# Patient Record
Sex: FEMALE | Race: WHITE | HISPANIC OR LATINO | Employment: FULL TIME | URBAN - METROPOLITAN AREA
[De-identification: names, ages, dates, MRNs, and addresses within clinical notes are randomized per-mention and may not be internally consistent; named-entity substitution may affect disease eponyms.]

---

## 2017-10-09 ENCOUNTER — NON-PROVIDER VISIT (OUTPATIENT)
Dept: OBGYN | Facility: CLINIC | Age: 27
End: 2017-10-09

## 2017-10-09 DIAGNOSIS — Z32.01 POSITIVE URINE PREGNANCY TEST: ICD-10-CM

## 2017-10-09 LAB
INT CON NEG: NEGATIVE
INT CON POS: POSITIVE
POC URINE PREGNANCY TEST: POSITIVE

## 2017-10-09 PROCEDURE — 81025 URINE PREGNANCY TEST: CPT | Performed by: OBSTETRICS & GYNECOLOGY

## 2017-10-11 ENCOUNTER — APPOINTMENT (OUTPATIENT)
Dept: RADIOLOGY | Facility: MEDICAL CENTER | Age: 27
End: 2017-10-11
Attending: EMERGENCY MEDICINE
Payer: MEDICAID

## 2017-10-11 ENCOUNTER — HOSPITAL ENCOUNTER (EMERGENCY)
Facility: MEDICAL CENTER | Age: 27
End: 2017-10-11
Attending: EMERGENCY MEDICINE
Payer: MEDICAID

## 2017-10-11 VITALS
SYSTOLIC BLOOD PRESSURE: 118 MMHG | HEART RATE: 99 BPM | RESPIRATION RATE: 16 BRPM | OXYGEN SATURATION: 99 % | WEIGHT: 120.81 LBS | DIASTOLIC BLOOD PRESSURE: 62 MMHG | BODY MASS INDEX: 22.23 KG/M2 | TEMPERATURE: 98.2 F | HEIGHT: 62 IN

## 2017-10-11 DIAGNOSIS — O26.891 PELVIC PAIN AFFECTING PREGNANCY IN FIRST TRIMESTER, ANTEPARTUM: ICD-10-CM

## 2017-10-11 DIAGNOSIS — N83.11 CORPUS LUTEUM CYST OF RIGHT OVARY: ICD-10-CM

## 2017-10-11 DIAGNOSIS — R10.2 PELVIC PAIN AFFECTING PREGNANCY IN FIRST TRIMESTER, ANTEPARTUM: ICD-10-CM

## 2017-10-11 LAB
APPEARANCE UR: ABNORMAL
B-HCG SERPL-ACNC: ABNORMAL MIU/ML (ref 0–10)
COLOR UR: YELLOW
GLUCOSE UR STRIP.AUTO-MCNC: NEGATIVE MG/DL
HCG UR QL: POSITIVE
KETONES UR STRIP.AUTO-MCNC: NEGATIVE MG/DL
LEUKOCYTE ESTERASE UR QL STRIP.AUTO: NEGATIVE
NITRITE UR QL STRIP.AUTO: NEGATIVE
NUMBER OF RH DOSES IND 8505RD: NORMAL
PH UR STRIP.AUTO: 6 [PH]
PROT UR QL STRIP: NEGATIVE MG/DL
RBC UR QL AUTO: NEGATIVE
RH BLD: NORMAL
SP GR UR STRIP.AUTO: 1.01

## 2017-10-11 PROCEDURE — 86901 BLOOD TYPING SEROLOGIC RH(D): CPT

## 2017-10-11 PROCEDURE — 36415 COLL VENOUS BLD VENIPUNCTURE: CPT

## 2017-10-11 PROCEDURE — 84702 CHORIONIC GONADOTROPIN TEST: CPT

## 2017-10-11 PROCEDURE — 81002 URINALYSIS NONAUTO W/O SCOPE: CPT

## 2017-10-11 PROCEDURE — 76817 TRANSVAGINAL US OBSTETRIC: CPT

## 2017-10-11 PROCEDURE — 81025 URINE PREGNANCY TEST: CPT

## 2017-10-11 PROCEDURE — 99284 EMERGENCY DEPT VISIT MOD MDM: CPT

## 2017-10-11 ASSESSMENT — PAIN SCALES - GENERAL: PAINLEVEL_OUTOF10: 4

## 2017-10-11 NOTE — ED PROVIDER NOTES
"ED Provider Note    CHIEF COMPLAINT  Chief Complaint   Patient presents with   • Abdominal Cramping   • Pregnancy       HPI  Rachel Green is a 27 y.o. female who presents As a  who believes she is 6 weeks pregnant by dates. She has not had a prenatal visit yet. She states she has had cramping in both sides of her pelvis for the last 2 weeks which is worse in the evenings. She's had nausea with no vomiting. She denies vaginal bleeding or discharge. She denies dysuria or fevers.    REVIEW OF SYSTEMS  See HPI for further details. All other systems are negative.    PAST MEDICAL HISTORY  No past medical history on file.    FAMILY HISTORY  Family History   Problem Relation Age of Onset   • Diabetes Mother    • Diabetes Father        SOCIAL HISTORY  Social History     Social History   • Marital status: Single     Spouse name: N/A   • Number of children: N/A   • Years of education: N/A     Social History Main Topics   • Smoking status: Never Smoker   • Smokeless tobacco: Never Used   • Alcohol use Yes      Comment: rarely   • Drug use: No      Comment: pot x 5 days ago   • Sexual activity: Yes     Partners: Male     Other Topics Concern   • Not on file     Social History Narrative   • No narrative on file       SURGICAL HISTORY  Past Surgical History:   Procedure Laterality Date   • TONSILLECTOMY  08    Performed by TITUS MOORE at SURGERY SAME DAY [facility] ORS   • GYN SURGERY             CURRENT MEDICATIONS  None    ALLERGIES  Allergies   Allergen Reactions   • Nkda [No Known Drug Allergy]        PHYSICAL EXAM  VITAL SIGNS: /56   Pulse 92   Temp 36.8 °C (98.2 °F)   Resp 16   Ht 1.575 m (5' 2\")   Wt 54.8 kg (120 lb 13 oz)   SpO2 98%   BMI 22.10 kg/m²  @HANNA[[id number]::@   Constitutional: Well developed, Well nourished, No acute respiratory distress, Non-toxic appearance.   HENT: Normocephalic, Atraumatic, Bilateral external ears normal, Oropharynx clear, mucous membranes are " moist.  Eyes: PERRLA, EOMI, Conjunctiva normal, No discharge. No icterus.  Neck: Normal range of motion. Supple, No stridor.   Lymphatic: No cervical lymphadenopathy noted.   Cardiovascular: Normal heart rate, Normal rhythm, No murmurs, No rubs, No gallops.   Thorax & Lungs: Clear to auscultation bilaterally, No respiratory distress, No wheezing.  Abdomen: Soft, flat, normal active bowel sounds, no tenderness to palpation, no masses  Pelvic: Deferred as the patient has no unusual discharge or bleeding  Skin: Warm, Dry, No erythema, No rash.   Extremities: Intact distal pulses, No edema, No tenderness  Musculoskeletal: Good range of motion in all major joints. No tenderness to palpation or major deformities noted. Normal gait.  Neurologic: Alert & oriented x 3, cranial nerve and cerebellar exams grossly normal. No focal deficits noted.   Psychiatric: Affect normal, Judgment normal, Mood normal.       RADIOLOGY/PROCEDURES  US-OB PELVIS TRANSVAGINAL   Final Result      Live intrauterine pregnancy of approximately 5 weeks 5 days age by crown-rump length measurement.            COURSE & MEDICAL DECISION MAKING  Pertinent Labs & Imaging studies reviewed. (See chart for details)  Differential diagnosis includes menstrual cramps, ovarian cyst, cystitis, ectopic pregnancy    Results for orders placed or performed during the hospital encounter of 10/11/17   HCG QUANTITATIVE SERUM   Result Value Ref Range    Bhcg 65614.0 (H) 0.0 - 10.0 mIU/mL   RH TYPE FOR RHOGAM FROM E.D.   Result Value Ref Range    Emergency Department Rh Typing POS     Number Of Rh Doses Indicated ZERO    POC UA   Result Value Ref Range    POC Color Yellow     POC Appearance Other (A)     POC Glucose Negative Negative mg/dL    POC Ketones Negative Negative mg/dL    POC Specific Gravity 1.015 1.005 - 1.030    POC Blood Negative Negative    POC Urine PH 6.0 5.0 - 8.0    POC Protein Negative Negative mg/dL    POC Nitrites Negative Negative    POC Leukocyte  Esterase Negative Negative   POC URINE PREGNANCY   Result Value Ref Range    POC Urine Pregnancy Test Positive (A)      6:20 PM no signs of a portion. Patient can continue to take daily prenatal vitamins and Tylenol when necessary.     The patient will return for new or worsening symptoms and is stable at the time of discharge.        DISPOSITION:  Patient will be discharged home in stable condition.    FOLLOW UP:  [facility]  [address] [place] [id number]  [phone]    Call  to make appointment for your 1st trimester      OUTPATIENT MEDICATIONS:  New Prescriptions    No medications on file         FINAL IMPRESSION  1. Pelvic pain affecting pregnancy in first trimester, antepartum    2. Corpus luteum cyst of right ovary               Electronically signed by: Amarilys Batista, 10/11/2017 4:13 PM

## 2017-10-11 NOTE — ED NOTES
Reports recent positive pregnancy test, 6 weeks pregnant. Complaints of abdominal cramping for past few weeks, denies spotting

## 2017-10-12 NOTE — ED NOTES
Assisted with pt care. Pt D/C to home. D/C instructions given. Pt v/u. Pt leaves ED with no acute changes, complaints or concerns.

## 2017-10-12 NOTE — DISCHARGE INSTRUCTIONS
2 Tylenol every 8 hours in case of pain    Take prenatal vitamins every day-this is most important right now

## 2017-10-16 ENCOUNTER — APPOINTMENT (OUTPATIENT)
Dept: OBGYN | Facility: CLINIC | Age: 27
End: 2017-10-16

## 2017-11-20 ENCOUNTER — HOSPITAL ENCOUNTER (OUTPATIENT)
Facility: MEDICAL CENTER | Age: 27
End: 2017-11-20
Attending: NURSE PRACTITIONER
Payer: MEDICAID

## 2017-11-20 ENCOUNTER — INITIAL PRENATAL (OUTPATIENT)
Dept: OBGYN | Facility: CLINIC | Age: 27
End: 2017-11-20
Payer: MEDICAID

## 2017-11-20 VITALS
SYSTOLIC BLOOD PRESSURE: 100 MMHG | DIASTOLIC BLOOD PRESSURE: 64 MMHG | HEIGHT: 63 IN | WEIGHT: 123 LBS | BODY MASS INDEX: 21.79 KG/M2

## 2017-11-20 DIAGNOSIS — Z98.891 HISTORY OF CESAREAN SECTION: ICD-10-CM

## 2017-11-20 DIAGNOSIS — Z34.90 ENCOUNTER FOR SUPERVISION OF NORMAL PREGNANCY, ANTEPARTUM, UNSPECIFIED GRAVIDITY: ICD-10-CM

## 2017-11-20 DIAGNOSIS — O09.92 ENCOUNTER FOR SUPERVISION OF HIGH RISK PREGNANCY IN SECOND TRIMESTER, ANTEPARTUM: Primary | ICD-10-CM

## 2017-11-20 LAB
APPEARANCE UR: NORMAL
BILIRUB UR STRIP-MCNC: NORMAL MG/DL
COLOR UR AUTO: NORMAL
GLUCOSE UR STRIP.AUTO-MCNC: NORMAL MG/DL
KETONES UR STRIP.AUTO-MCNC: NORMAL MG/DL
LEUKOCYTE ESTERASE UR QL STRIP.AUTO: NORMAL
NITRITE UR QL STRIP.AUTO: NORMAL
PH UR STRIP.AUTO: 7.5 [PH] (ref 5–8)
PROT UR QL STRIP: NORMAL MG/DL
RBC UR QL AUTO: NORMAL
SP GR UR STRIP.AUTO: 1
UROBILINOGEN UR STRIP-MCNC: NORMAL MG/DL

## 2017-11-20 PROCEDURE — 87624 HPV HI-RISK TYP POOLED RSLT: CPT

## 2017-11-20 PROCEDURE — 88175 CYTOPATH C/V AUTO FLUID REDO: CPT

## 2017-11-20 PROCEDURE — 87591 N.GONORRHOEAE DNA AMP PROB: CPT

## 2017-11-20 PROCEDURE — 59402 PR NEW OB HIGH RISK: CPT | Performed by: NURSE PRACTITIONER

## 2017-11-20 PROCEDURE — 90686 IIV4 VACC NO PRSV 0.5 ML IM: CPT | Performed by: NURSE PRACTITIONER

## 2017-11-20 PROCEDURE — 81002 URINALYSIS NONAUTO W/O SCOPE: CPT | Performed by: NURSE PRACTITIONER

## 2017-11-20 PROCEDURE — 87491 CHLMYD TRACH DNA AMP PROBE: CPT

## 2017-11-20 PROCEDURE — 90471 IMMUNIZATION ADMIN: CPT | Performed by: NURSE PRACTITIONER

## 2017-11-20 ASSESSMENT — ENCOUNTER SYMPTOMS
EYES NEGATIVE: 1
RESPIRATORY NEGATIVE: 1
CONSTITUTIONAL NEGATIVE: 1
MUSCULOSKELETAL NEGATIVE: 1
CARDIOVASCULAR NEGATIVE: 1
NEUROLOGICAL NEGATIVE: 1
GASTROINTESTINAL NEGATIVE: 1
PSYCHIATRIC NEGATIVE: 1

## 2017-11-20 NOTE — LETTER
Cystic Fibrosis Carrier Testing  Rachel Green    The following information is about a blood test that can be done to determine if you and/or your partner carry the gene for cystic fibrosis.    WHAT IS CYSTIC FIBROSIS?  · Cystic fibrosis (CF) is an inherited disease that affects more than 25,000 American children and young adults.  · Symptoms of CF vary but include lung congestion, pneumonia, diarrhea and poor growth.  Most people with CF have severe medical problems and some die at a young age.  Others have so few symptoms they are unaware they have CF.  · CF does not affect intelligence.  · Although there is no cure for CF at this time, scientists are making progress in improving treatment and in searching for a cure.  In the past many people with CF  at a very young age.  Today, many are living into their 20’s and 30’s.    IS THERE A CHANCE MY BABY COULD HAVE CYSTIC FIBROSIS?  · You can have a child with CF even if there is no history in your family (see chart below).  · CF testing can help determine if you are a carrier and at risk to have a child with CF.  Note: if both parents are carriers, there is a 1 in 4 (25%) chance with each pregnancy that they will have a child with CF.  · Carriers have one normal CF gene and one altered CF gene.  · People with CF have two altered CF genes.  · Most people have two normal copies of the CF gene.    Approximate risk that a couple with no family history of cystic fibrosis will have a child with cystic fibrosis:    Ethnic background / Risk     couple:  1 in 2,500   couple:  1 in 15,000            couple:  1 in 8,000     American couple:  1 in 32,000     WHAT TESTING IS AVAILABLE?  · There is a blood test that can be done to find out if you or your partner is a carrier.  · It is important to understand that CF carrier testing does not detect all CF carriers.  · If the test shows that you are both CF carriers, you unborn baby  can be tested to find out if the baby has CF.    HOW MUCH DOES IT COST TO HAVE CYSTIC FIBROSIS CARRIER TESTING?  · Cost and insurance coverage for CF carrier testing vary depending upon the laboratory used and your insurance policy.  · The average cost for CF carrier testing is $300 per person.  · Your genetic counselor can provide you with more information about cystic fibrosis carrier testing.    _____  Yes, I am interested in discussing carrier testing with a genetic counselor.    _____  No, I am not interested in CF carrier testing or in receiving more information about CF carrier testing.      Client signature: ________________________________________  11/20/2017

## 2017-11-20 NOTE — PROGRESS NOTES
Pt here today for NOB visit. Pt had US done at [facility] on 10/11/2017  LMP: 08/28/2017  WT: 123 lb  BP: 100/64  Pt states having lots of N&V. States no other concerns.   Desires repeat C/Section.   Desires AFP.   Desires flu vaccine  Preferred pharmacy verified with pt.   Stan #[phone]

## 2017-11-20 NOTE — PROGRESS NOTES
"Subjective:      S:  Rachel Green is a 27 y.o.  female  @ EGA: 12w0d KADIE: Estimated Date of Delivery: 18  per US who presents for her new OB exam. She has a history of C/S at term for fetal distress. Her daughter spent 4 weeks in NICU and has CP and epilepsy. Desires a repeat C/S. She has no complaints.  Desires AFP.  Declines CF.  Reports no FM, VB, LOF, or cramping.  Denies dysuria, vaginal DC.  Pt is single and lives with daughter. Works as as  and . No heavy lifting and works in ventilated area..  Pregnancy is desired.  Declines Centering Pregnancy.    O:    Vitals:    17 1324   BP: 100/64   Weight: 55.8 kg (123 lb)   Height: 1.595 m (5' 2.8\")    See H&P Prenatal Physical.  Wet mount: not indicated        FHTs: 160        Fundal ht: 12cm     A:   1.  IUP @ 12w0d KADIE: Estimated Date of Delivery: 18 per US         2.  S=D        3.    Patient Active Problem List    Diagnosis Date Noted   • Encounter for supervision of high risk pregnancy in second trimester, antepartum 2017   • History of  section, desires repeat 2017         P:  1.  GC/CT done. Pap done.         2.  Prenatal labs ordered - lab slip given        3.  Discussed PNV, diet, and adequate water intake        4.  NOB packet given        5.  Return to office in 4 wks        6.  Complete OB US in 7-8 wks          HPI    Review of Systems   Constitutional: Negative.    HENT: Negative.    Eyes: Negative.    Respiratory: Negative.    Cardiovascular: Negative.    Gastrointestinal: Negative.    Genitourinary: Negative.         Uterus enlarged   Musculoskeletal: Negative.    Skin: Negative.    Neurological: Negative.    Endo/Heme/Allergies: Negative.    Psychiatric/Behavioral: Negative.    All other systems reviewed and are negative.         Objective:     /64   Ht 1.595 m (5' 2.8\")   Wt 55.8 kg (123 lb)   LMP 2017   BMI 21.93 kg/m²      Physical Exam "   Constitutional: She is oriented to person, place, and time. She appears well-developed and well-nourished.   HENT:   Head: Normocephalic and atraumatic.   Eyes: Pupils are equal, round, and reactive to light.   Neck: Normal range of motion. Neck supple.   Cardiovascular: Normal rate, regular rhythm and normal heart sounds.    Pulmonary/Chest: Effort normal and breath sounds normal.   Abdominal: Soft.   Genitourinary: Vagina normal and uterus normal.   Musculoskeletal: Normal range of motion.   Neurological: She is alert and oriented to person, place, and time. She has normal reflexes.   Skin: Skin is warm and dry.   Psychiatric: She has a normal mood and affect. Her behavior is normal. Judgment and thought content normal.   Nursing note and vitals reviewed.              Assessment/Plan:     1. Encounter for supervision of high risk pregnancy in second trimester, antepartum      2. Encounter for supervision of normal pregnancy, antepartum, unspecified     - POCT Urinalysis  - PREG CNTR PRENATAL PN; Future  - THINPREP PAP W/HPV AND CTNG; Future  - US-OB 2ND 3RD TRI COMPLETE; Future  - CONSENT FOR INFLUENZA VACCINE  - Flu Quad Inj >3 Year Pre-Filled (Preservative Free)    3. History of  section, desires repeat

## 2017-11-21 DIAGNOSIS — Z34.90 ENCOUNTER FOR SUPERVISION OF NORMAL PREGNANCY, ANTEPARTUM, UNSPECIFIED GRAVIDITY: ICD-10-CM

## 2017-11-22 LAB
C TRACH DNA GENITAL QL NAA+PROBE: NEGATIVE
CYTOLOGY REG CYTOL: NORMAL
HPV HR 12 DNA CVX QL NAA+PROBE: NEGATIVE
HPV16 DNA SPEC QL NAA+PROBE: NEGATIVE
HPV18 DNA SPEC QL NAA+PROBE: NEGATIVE
N GONORRHOEA DNA GENITAL QL NAA+PROBE: NEGATIVE
SPECIMEN SOURCE: NORMAL
SPECIMEN SOURCE: NORMAL

## 2017-12-04 ENCOUNTER — HOSPITAL ENCOUNTER (OUTPATIENT)
Dept: LAB | Facility: MEDICAL CENTER | Age: 27
End: 2017-12-04
Attending: NURSE PRACTITIONER
Payer: MEDICAID

## 2017-12-04 DIAGNOSIS — Z34.90 ENCOUNTER FOR SUPERVISION OF NORMAL PREGNANCY, ANTEPARTUM, UNSPECIFIED GRAVIDITY: ICD-10-CM

## 2017-12-04 PROBLEM — Z28.39 RUBELLA NON-IMMUNE STATUS, ANTEPARTUM: Status: ACTIVE | Noted: 2017-12-04

## 2017-12-04 PROBLEM — O09.899 RUBELLA NON-IMMUNE STATUS, ANTEPARTUM: Status: ACTIVE | Noted: 2017-12-04

## 2017-12-04 LAB
ABO GROUP BLD: NORMAL
APPEARANCE UR: ABNORMAL
BACTERIA #/AREA URNS HPF: ABNORMAL /HPF
BASOPHILS # BLD AUTO: 0.4 % (ref 0–1.8)
BASOPHILS # BLD: 0.03 K/UL (ref 0–0.12)
BILIRUB UR QL STRIP.AUTO: NEGATIVE
BLD GP AB SCN SERPL QL: NORMAL
COLOR UR: YELLOW
CULTURE IF INDICATED INDCX: YES UA CULTURE
EOSINOPHIL # BLD AUTO: 0.07 K/UL (ref 0–0.51)
EOSINOPHIL NFR BLD: 0.9 % (ref 0–6.9)
EPI CELLS #/AREA URNS HPF: ABNORMAL /HPF
ERYTHROCYTE [DISTWIDTH] IN BLOOD BY AUTOMATED COUNT: 40.4 FL (ref 35.9–50)
GLUCOSE UR STRIP.AUTO-MCNC: NEGATIVE MG/DL
HBV SURFACE AG SER QL: NEGATIVE
HCT VFR BLD AUTO: 37.8 % (ref 37–47)
HGB BLD-MCNC: 12.9 G/DL (ref 12–16)
HIV 1+2 AB+HIV1 P24 AG SERPL QL IA: NON REACTIVE
IMM GRANULOCYTES # BLD AUTO: 0.02 K/UL (ref 0–0.11)
IMM GRANULOCYTES NFR BLD AUTO: 0.2 % (ref 0–0.9)
KETONES UR STRIP.AUTO-MCNC: NEGATIVE MG/DL
LEUKOCYTE ESTERASE UR QL STRIP.AUTO: ABNORMAL
LYMPHOCYTES # BLD AUTO: 1.99 K/UL (ref 1–4.8)
LYMPHOCYTES NFR BLD: 24.6 % (ref 22–41)
MCH RBC QN AUTO: 31.5 PG (ref 27–33)
MCHC RBC AUTO-ENTMCNC: 34.1 G/DL (ref 33.6–35)
MCV RBC AUTO: 92.2 FL (ref 81.4–97.8)
MICRO URNS: ABNORMAL
MONOCYTES # BLD AUTO: 0.57 K/UL (ref 0–0.85)
MONOCYTES NFR BLD AUTO: 7.1 % (ref 0–13.4)
NEUTROPHILS # BLD AUTO: 5.4 K/UL (ref 2–7.15)
NEUTROPHILS NFR BLD: 66.8 % (ref 44–72)
NITRITE UR QL STRIP.AUTO: NEGATIVE
NRBC # BLD AUTO: 0 K/UL
NRBC BLD AUTO-RTO: 0 /100 WBC
PH UR STRIP.AUTO: 6 [PH]
PLATELET # BLD AUTO: 215 K/UL (ref 164–446)
PMV BLD AUTO: 10.7 FL (ref 9–12.9)
PROT UR QL STRIP: NEGATIVE MG/DL
RBC # BLD AUTO: 4.1 M/UL (ref 4.2–5.4)
RBC # URNS HPF: ABNORMAL /HPF
RBC UR QL AUTO: NEGATIVE
RH BLD: NORMAL
RUBV AB SER QL: 8.2 IU/ML
SP GR UR STRIP.AUTO: 1.02
TREPONEMA PALLIDUM IGG+IGM AB [PRESENCE] IN SERUM OR PLASMA BY IMMUNOASSAY: NON REACTIVE
UROBILINOGEN UR STRIP.AUTO-MCNC: 0.2 MG/DL
WBC # BLD AUTO: 8.1 K/UL (ref 4.8–10.8)
WBC #/AREA URNS HPF: ABNORMAL /HPF

## 2017-12-04 PROCEDURE — 87086 URINE CULTURE/COLONY COUNT: CPT

## 2017-12-04 PROCEDURE — 86762 RUBELLA ANTIBODY: CPT

## 2017-12-04 PROCEDURE — 86850 RBC ANTIBODY SCREEN: CPT

## 2017-12-04 PROCEDURE — 86780 TREPONEMA PALLIDUM: CPT

## 2017-12-04 PROCEDURE — 87340 HEPATITIS B SURFACE AG IA: CPT

## 2017-12-04 PROCEDURE — 87389 HIV-1 AG W/HIV-1&-2 AB AG IA: CPT

## 2017-12-04 PROCEDURE — 86900 BLOOD TYPING SEROLOGIC ABO: CPT

## 2017-12-04 PROCEDURE — 85025 COMPLETE CBC W/AUTO DIFF WBC: CPT

## 2017-12-04 PROCEDURE — 36415 COLL VENOUS BLD VENIPUNCTURE: CPT

## 2017-12-04 PROCEDURE — 86901 BLOOD TYPING SEROLOGIC RH(D): CPT

## 2017-12-04 PROCEDURE — 87077 CULTURE AEROBIC IDENTIFY: CPT

## 2017-12-04 PROCEDURE — 81001 URINALYSIS AUTO W/SCOPE: CPT

## 2017-12-06 ENCOUNTER — TELEPHONE (OUTPATIENT)
Dept: OBGYN | Facility: CLINIC | Age: 27
End: 2017-12-06

## 2017-12-06 PROBLEM — R82.71 GBS BACTERIURIA: Status: ACTIVE | Noted: 2017-12-06

## 2017-12-06 LAB
BACTERIA UR CULT: ABNORMAL
SIGNIFICANT IND 70042: ABNORMAL
SOURCE SOURCE: ABNORMAL

## 2017-12-06 RX ORDER — CEPHALEXIN 500 MG/1
500 CAPSULE ORAL 2 TIMES DAILY
Qty: 14 CAP | Refills: 0 | Status: SHIPPED | OUTPATIENT
Start: 2017-12-06 | End: 2017-12-13

## 2017-12-06 NOTE — TELEPHONE ENCOUNTER
----- Message from JUHI Manzano sent at 12/6/2017  9:11 AM PST -----  Call patient and let her know her urine culture has GBS in urine ask if she is symptomatic for UTI   If not will treat in labor if any s/s will treat now and in labor, let us know  Thanks Shikha  12/6/17. Patient report burning with urination and urinary frequency. Pharmacy verified. Consulted with Rosalee martinez CNM. She agrees to send RX to pharmacy.  msg left on patient stating Rx was sent and to call back in case of questions.

## 2018-01-03 ENCOUNTER — APPOINTMENT (OUTPATIENT)
Dept: RADIOLOGY | Facility: MEDICAL CENTER | Age: 28
End: 2018-01-03
Attending: EMERGENCY MEDICINE
Payer: MEDICAID

## 2018-01-03 ENCOUNTER — HOSPITAL ENCOUNTER (EMERGENCY)
Facility: MEDICAL CENTER | Age: 28
End: 2018-01-03
Attending: EMERGENCY MEDICINE
Payer: MEDICAID

## 2018-01-03 VITALS
HEART RATE: 80 BPM | SYSTOLIC BLOOD PRESSURE: 105 MMHG | WEIGHT: 130.73 LBS | OXYGEN SATURATION: 99 % | RESPIRATION RATE: 16 BRPM | BODY MASS INDEX: 24.06 KG/M2 | HEIGHT: 62 IN | TEMPERATURE: 97.9 F | DIASTOLIC BLOOD PRESSURE: 68 MMHG

## 2018-01-03 DIAGNOSIS — O26.899 PELVIC PAIN IN PREGNANCY: ICD-10-CM

## 2018-01-03 DIAGNOSIS — O21.9 NAUSEA AND VOMITING DURING PREGNANCY: ICD-10-CM

## 2018-01-03 DIAGNOSIS — R10.2 PELVIC PAIN IN PREGNANCY: ICD-10-CM

## 2018-01-03 LAB
ALBUMIN SERPL BCP-MCNC: 3.8 G/DL (ref 3.2–4.9)
ALBUMIN/GLOB SERPL: 1.2 G/DL
ALP SERPL-CCNC: 32 U/L (ref 30–99)
ALT SERPL-CCNC: 15 U/L (ref 2–50)
ANION GAP SERPL CALC-SCNC: 8 MMOL/L (ref 0–11.9)
APPEARANCE UR: CLEAR
AST SERPL-CCNC: 16 U/L (ref 12–45)
B-HCG SERPL-ACNC: ABNORMAL MIU/ML (ref 0–5)
BASOPHILS # BLD AUTO: 0.2 % (ref 0–1.8)
BASOPHILS # BLD: 0.02 K/UL (ref 0–0.12)
BILIRUB SERPL-MCNC: 0.3 MG/DL (ref 0.1–1.5)
BILIRUB UR QL STRIP.AUTO: NEGATIVE
BUN SERPL-MCNC: 9 MG/DL (ref 8–22)
CALCIUM SERPL-MCNC: 8.6 MG/DL (ref 8.5–10.5)
CHLORIDE SERPL-SCNC: 106 MMOL/L (ref 96–112)
CO2 SERPL-SCNC: 21 MMOL/L (ref 20–33)
COLOR UR: YELLOW
CREAT SERPL-MCNC: 0.32 MG/DL (ref 0.5–1.4)
EOSINOPHIL # BLD AUTO: 0.07 K/UL (ref 0–0.51)
EOSINOPHIL NFR BLD: 0.7 % (ref 0–6.9)
ERYTHROCYTE [DISTWIDTH] IN BLOOD BY AUTOMATED COUNT: 39.4 FL (ref 35.9–50)
GFR SERPL CREATININE-BSD FRML MDRD: >60 ML/MIN/1.73 M 2
GLOBULIN SER CALC-MCNC: 3.1 G/DL (ref 1.9–3.5)
GLUCOSE SERPL-MCNC: 88 MG/DL (ref 65–99)
GLUCOSE UR STRIP.AUTO-MCNC: NEGATIVE MG/DL
HCT VFR BLD AUTO: 35.2 % (ref 37–47)
HGB BLD-MCNC: 12.3 G/DL (ref 12–16)
IMM GRANULOCYTES # BLD AUTO: 0.05 K/UL (ref 0–0.11)
IMM GRANULOCYTES NFR BLD AUTO: 0.5 % (ref 0–0.9)
KETONES UR STRIP.AUTO-MCNC: NEGATIVE MG/DL
LEUKOCYTE ESTERASE UR QL STRIP.AUTO: NEGATIVE
LIPASE SERPL-CCNC: 24 U/L (ref 11–82)
LYMPHOCYTES # BLD AUTO: 1.96 K/UL (ref 1–4.8)
LYMPHOCYTES NFR BLD: 20.4 % (ref 22–41)
MCH RBC QN AUTO: 31.7 PG (ref 27–33)
MCHC RBC AUTO-ENTMCNC: 34.9 G/DL (ref 33.6–35)
MCV RBC AUTO: 90.7 FL (ref 81.4–97.8)
MICRO URNS: NORMAL
MONOCYTES # BLD AUTO: 0.69 K/UL (ref 0–0.85)
MONOCYTES NFR BLD AUTO: 7.2 % (ref 0–13.4)
NEUTROPHILS # BLD AUTO: 6.83 K/UL (ref 2–7.15)
NEUTROPHILS NFR BLD: 71 % (ref 44–72)
NITRITE UR QL STRIP.AUTO: NEGATIVE
NRBC # BLD AUTO: 0 K/UL
NRBC BLD-RTO: 0 /100 WBC
PH UR STRIP.AUTO: 7.5 [PH]
PLATELET # BLD AUTO: 200 K/UL (ref 164–446)
PMV BLD AUTO: 9.2 FL (ref 9–12.9)
POTASSIUM SERPL-SCNC: 3.7 MMOL/L (ref 3.6–5.5)
PROT SERPL-MCNC: 6.9 G/DL (ref 6–8.2)
PROT UR QL STRIP: NEGATIVE MG/DL
RBC # BLD AUTO: 3.88 M/UL (ref 4.2–5.4)
RBC UR QL AUTO: NEGATIVE
SODIUM SERPL-SCNC: 135 MMOL/L (ref 135–145)
SP GR UR STRIP.AUTO: 1.01
UROBILINOGEN UR STRIP.AUTO-MCNC: 0.2 MG/DL
WBC # BLD AUTO: 9.6 K/UL (ref 4.8–10.8)

## 2018-01-03 PROCEDURE — 700111 HCHG RX REV CODE 636 W/ 250 OVERRIDE (IP): Performed by: EMERGENCY MEDICINE

## 2018-01-03 PROCEDURE — 83690 ASSAY OF LIPASE: CPT

## 2018-01-03 PROCEDURE — 84702 CHORIONIC GONADOTROPIN TEST: CPT

## 2018-01-03 PROCEDURE — 36415 COLL VENOUS BLD VENIPUNCTURE: CPT

## 2018-01-03 PROCEDURE — 85025 COMPLETE CBC W/AUTO DIFF WBC: CPT

## 2018-01-03 PROCEDURE — 76815 OB US LIMITED FETUS(S): CPT

## 2018-01-03 PROCEDURE — 99284 EMERGENCY DEPT VISIT MOD MDM: CPT

## 2018-01-03 PROCEDURE — 80053 COMPREHEN METABOLIC PANEL: CPT

## 2018-01-03 PROCEDURE — 81003 URINALYSIS AUTO W/O SCOPE: CPT

## 2018-01-03 RX ORDER — ONDANSETRON 4 MG/1
4 TABLET, ORALLY DISINTEGRATING ORAL EVERY 8 HOURS PRN
Qty: 10 TAB | Refills: 0 | Status: SHIPPED | OUTPATIENT
Start: 2018-01-03 | End: 2021-07-19

## 2018-01-03 RX ORDER — ONDANSETRON 4 MG/1
4 TABLET, ORALLY DISINTEGRATING ORAL ONCE
Status: COMPLETED | OUTPATIENT
Start: 2018-01-03 | End: 2018-01-03

## 2018-01-03 RX ADMIN — ONDANSETRON 4 MG: 4 TABLET, ORALLY DISINTEGRATING ORAL at 16:49

## 2018-01-03 ASSESSMENT — PAIN SCALES - GENERAL: PAINLEVEL_OUTOF10: 7

## 2018-01-03 NOTE — ED NOTES
Pt ambulates to triage  Chief Complaint   Patient presents with   • N/V     x 5 days   • Abdominal Cramping   17 wks pregnant, denies vaginal bleeding  Pt asked to wait in lobby, pt updated on triage process and pt asked to inform RN of any changes.

## 2018-01-04 NOTE — ED NOTES
"Pt states that she is having intermittent abd cramping, denies urinary sx, denies vaginal bleeding. Pt states that \"when I had my daughter the placenta  from my uterus\", NAD at this time.  "

## 2018-01-04 NOTE — ED PROVIDER NOTES
"ED Provider Note    CHIEF COMPLAINT  Chief Complaint   Patient presents with   • N/V     x 5 days   • Abdominal Cramping       HPI  Rachel Green is a 27 y.o. female who presents For evaluation of abdominal pain, nausea and vomiting in the setting of pregnancy. She is  at approximate 17 weeks, over this time frame she's had no diarrhea, no dysuria or hematuria, no vaginal bleeding or discharge. She states her pain is more similar with abdominal and pelvic cramping. No significant back pain or chest pain or shortness of breath, no fever. She states that she is otherwise healthy and has no significant medical problems.    REVIEW OF SYSTEMS  Negative for fever, rash, chest pain, dyspnea,headache, focal weakness, focal numbness, focal tingling, back pain. All other systems are negative.     PAST MEDICAL HISTORY  Past Medical History:   Diagnosis Date   • Blood transfusion without reported diagnosis        FAMILY HISTORY  Family History   Problem Relation Age of Onset   • Diabetes Mother    • Diabetes Father        SOCIAL HISTORY  Social History   Substance Use Topics   • Smoking status: Current Every Day Smoker     Years: 3.00     Types: Cigarettes   • Smokeless tobacco: Never Used      Comment: Quit on 2017   • Alcohol use Yes       SURGICAL HISTORY  Past Surgical History:   Procedure Laterality Date   • TONSILLECTOMY  08    Performed by TITUS MOORE at SURGERY SAME DAY [facility] ORS   • PRIMARY C SECTION  2007   • GYN SURGERY         • OTHER      tubes in the ears at age 22       CURRENT MEDICATIONS  I personally reviewed the medication list in the charting documentation.     ALLERGIES  Allergies   Allergen Reactions   • Nkda [No Known Drug Allergy]        MEDICAL RECORD  I have reviewed patient's medical record and pertinent results are listed above.      PHYSICAL EXAM  VITAL SIGNS: /83   Pulse 92   Temp 36.6 °C (97.9 °F) (Temporal)   Resp 18   Ht 1.575 m (5' 2\")   " Wt 59.3 kg (130 lb 11.7 oz)   LMP 08/28/2017   SpO2 97%   BMI 23.91 kg/m²    Constitutional: Well appearing patient in no acute distress.  Not toxic, nor ill in appearance.  HENT: Mucus membranes moist.    Eyes: No scleral icterus. Normal conjunctiva   Neck: Supple, comfortable, nonpainful range of motion.   Cardiovascular: Regular heart rate and rhythm.   Thorax & Lungs: Chest is nontender.  Lungs are clear to auscultation with good air movement bilaterally.  No wheeze, rhonchi, nor rales.   Abdomen: Soft, nondistended, gravid, mild generalized lower abdominal tenderness, no rebound or guarding  Skin: Warm, dry. No rash appreciated  Extremities/Musculoskeletal: No sign of trauma. No asymmetric calf tenderness, erythema or edema. Normal range of motion   Neurologic: Alert & oriented. No focal deficits observed.   Psychiatric: Normal affect appropriate for the clinical situation.    DIAGNOSTIC STUDIES / PROCEDURES    LABS  Results for orders placed or performed during the hospital encounter of 01/03/18   URINALYSIS   Result Value Ref Range    Color Yellow     Character Clear     Specific Gravity 1.006 <1.035    Ph 7.5 5.0 - 8.0    Glucose Negative Negative mg/dL    Ketones Negative Negative mg/dL    Protein Negative Negative mg/dL    Bilirubin Negative Negative    Urobilinogen, Urine 0.2 Negative    Nitrite Negative Negative    Leukocyte Esterase Negative Negative    Occult Blood Negative Negative    Micro Urine Req see below    CBC WITH DIFFERENTIAL   Result Value Ref Range    WBC 9.6 4.8 - 10.8 K/uL    RBC 3.88 (L) 4.20 - 5.40 M/uL    Hemoglobin 12.3 12.0 - 16.0 g/dL    Hematocrit 35.2 (L) 37.0 - 47.0 %    MCV 90.7 81.4 - 97.8 fL    MCH 31.7 27.0 - 33.0 pg    MCHC 34.9 33.6 - 35.0 g/dL    RDW 39.4 35.9 - 50.0 fL    Platelet Count 200 164 - 446 K/uL    MPV 9.2 9.0 - 12.9 fL    Neutrophils-Polys 71.00 44.00 - 72.00 %    Lymphocytes 20.40 (L) 22.00 - 41.00 %    Monocytes 7.20 0.00 - 13.40 %    Eosinophils 0.70 0.00  - 6.90 %    Basophils 0.20 0.00 - 1.80 %    Immature Granulocytes 0.50 0.00 - 0.90 %    Nucleated RBC 0.00 /100 WBC    Neutrophils (Absolute) 6.83 2.00 - 7.15 K/uL    Lymphs (Absolute) 1.96 1.00 - 4.80 K/uL    Monos (Absolute) 0.69 0.00 - 0.85 K/uL    Eos (Absolute) 0.07 0.00 - 0.51 K/uL    Baso (Absolute) 0.02 0.00 - 0.12 K/uL    Immature Granulocytes (abs) 0.05 0.00 - 0.11 K/uL    NRBC (Absolute) 0.00 K/uL   COMP METABOLIC PANEL   Result Value Ref Range    Sodium 135 135 - 145 mmol/L    Potassium 3.7 3.6 - 5.5 mmol/L    Chloride 106 96 - 112 mmol/L    Co2 21 20 - 33 mmol/L    Anion Gap 8.0 0.0 - 11.9    Glucose 88 65 - 99 mg/dL    Bun 9 8 - 22 mg/dL    Creatinine 0.32 (L) 0.50 - 1.40 mg/dL    Calcium 8.6 8.5 - 10.5 mg/dL    AST(SGOT) 16 12 - 45 U/L    ALT(SGPT) 15 2 - 50 U/L    Alkaline Phosphatase 32 30 - 99 U/L    Total Bilirubin 0.3 0.1 - 1.5 mg/dL    Albumin 3.8 3.2 - 4.9 g/dL    Total Protein 6.9 6.0 - 8.2 g/dL    Globulin 3.1 1.9 - 3.5 g/dL    A-G Ratio 1.2 g/dL   LIPASE   Result Value Ref Range    Lipase 24 11 - 82 U/L   BETA-HCG QUANTITATIVE SERUM   Result Value Ref Range    [facility] ([facility] 0.0 - 5.0 mIU/mL   ESTIMATED GFR   Result Value Ref Range    GFR If African American >60 >60 mL/min/1.73 m 2    GFR If Non African American >60 >60 mL/min/1.73 m 2         RADIOLOGY  US-OB LIMITED TRANSABDOMINAL   Final Result      Single intrauterine pregnancy of an estimated gestational age of 18 weeks 2 days with an estimated date of delivery of 6/4/2018.             COURSE & MEDICAL DECISION MAKING  I have reviewed any medical record information, laboratory studies and radiographic results as noted above.  Differential diagnoses includes: Dehydration, electro-lyte abnormalities, bacteriuria, miscarriage    Encounter Summary: This is a 27 y.o. female with nausea, vomiting and pain in the setting of pregnancy, no vaginal bleeding or discharge, no urinary complaints, looks well on exam, triage and her blood work is  largely unremarkable, there is no evidence of dehydration or urinary abnormalities. Ultrasound of the pelvis reveals an otherwise unremarkable uterine gestation. At this point, I've no clear etiology for the patient's pain other than the pregnancy itself, she is being discharged home in stable condition, she will be prescribed Zofran ever had a long discussion with her regarding the risks and benefits of the fetus. Stable and appropriate for discharge to strict return instructions discussed      DISPOSITION: Discharged home in stable condition      FINAL IMPRESSION  1. Pelvic pain in pregnancy    2. Nausea and vomiting during pregnancy           This dictation was created using voice recognition software. The accuracy of the dictation is limited to the abilities of the software. I expect there may be some errors of grammar and possibly content. The nursing notes were reviewed and certain aspects of this information were incorporated into this note.    Electronically signed by: Reyes Lucas, 1/3/2018 6:43 PM

## 2018-04-13 ENCOUNTER — HOSPITAL ENCOUNTER (OUTPATIENT)
Facility: MEDICAL CENTER | Age: 28
End: 2018-04-13
Attending: SPECIALIST | Admitting: SPECIALIST
Payer: MEDICAID

## 2018-04-13 VITALS
RESPIRATION RATE: 16 BRPM | SYSTOLIC BLOOD PRESSURE: 123 MMHG | TEMPERATURE: 98.5 F | DIASTOLIC BLOOD PRESSURE: 73 MMHG | HEART RATE: 91 BPM

## 2018-04-13 LAB
APPEARANCE UR: ABNORMAL
COLOR UR AUTO: YELLOW
GLUCOSE UR QL STRIP.AUTO: 250 MG/DL
KETONES UR QL STRIP.AUTO: NEGATIVE MG/DL
LEUKOCYTE ESTERASE UR QL STRIP.AUTO: NEGATIVE
NITRITE UR QL STRIP.AUTO: NEGATIVE
PH UR STRIP.AUTO: 7 [PH]
PROT UR QL STRIP: NEGATIVE MG/DL
RBC UR QL AUTO: ABNORMAL
SP GR UR: 1.01

## 2018-04-13 PROCEDURE — 59025 FETAL NON-STRESS TEST: CPT | Performed by: SPECIALIST

## 2018-04-13 PROCEDURE — 81002 URINALYSIS NONAUTO W/O SCOPE: CPT

## 2018-04-13 NOTE — PROGRESS NOTES
1100-pt presents from home with c/o cramping over the last 2 days that has gotten worse today, no c/o leaking, bleeding or other pain, states baby is moving normally, placed on external monitors, vs taken, ua dipped  1130-message left for Dr Adame on cell phone  1200-left message at office for Dr Adame  1230-called and left message at office again  1250-TC Dr Adame, pt has a few contractions, but is not feeling them at this time, report given, wants SVE, if long/thick/closed pt may go home  1255-SVE closed/floating  1300-pt discharged home with PTL precautions, verbalized understanding, left ambulatory with SO

## 2018-04-18 ENCOUNTER — HOSPITAL ENCOUNTER (OUTPATIENT)
Dept: LAB | Facility: MEDICAL CENTER | Age: 28
End: 2018-04-18
Attending: SPECIALIST
Payer: MEDICAID

## 2018-04-18 LAB — FIBRONECTIN FETAL SPEC QL: NEGATIVE

## 2018-04-18 PROCEDURE — 82731 ASSAY OF FETAL FIBRONECTIN: CPT

## 2018-05-03 ENCOUNTER — HOSPITAL ENCOUNTER (OUTPATIENT)
Facility: MEDICAL CENTER | Age: 28
End: 2018-05-03
Attending: SPECIALIST | Admitting: SPECIALIST
Payer: MEDICAID

## 2018-05-03 VITALS
TEMPERATURE: 97.3 F | SYSTOLIC BLOOD PRESSURE: 132 MMHG | WEIGHT: 160 LBS | BODY MASS INDEX: 29.44 KG/M2 | DIASTOLIC BLOOD PRESSURE: 82 MMHG | HEART RATE: 90 BPM | HEIGHT: 62 IN

## 2018-05-03 PROCEDURE — 59025 FETAL NON-STRESS TEST: CPT | Performed by: SPECIALIST

## 2018-05-04 NOTE — PROGRESS NOTES
EDC  34w6d. Pt presents to L&D with complaints of pelvic pain and UC's. Pt taken to triage for assessment.     195 TOCO and EFM applied, VSS. PT states that she has been cramping and emeka for a couple of weeks. Today she felt like the contractions got more uncomfortable and she began to feel some pelvic pressure and pain. PT reports +FM, denies LOF or VB.      Dr. Adame on floor, updated on pt status, SVE closed/thick/high. Orders for discharge received.      RN at bedside,  labor precautions given and all questions answered.      Pt discharged home in stable condition.

## 2018-05-10 NOTE — H&P
DATE OF SCHEDULED ADMISSION:  2018    REASON FOR ADMISSION:  Elective repeat low transverse  section.    HISTORY OF PRESENT ILLNESS:  This is a 28-year-old  2, para 1, at   39-6/7 weeks' gestation who states that she is now interested in proceeding   forward with elective repeat low transverse  section.  Risks,   benefits, and alternatives were addressed with the patient.  She has asked   appropriate questions, signed the appropriate consents, and wished to proceed   forward with delivery as planned.    PAST MEDICAL HISTORY:  Migraine headaches, marijuana usage intermittently   during the course of this pregnancy.    PAST SURGICAL HISTORY:  Primary low transverse  section.    OBSTETRICAL HISTORY:  This is her second pregnancy, first pregnancy was at 40   weeks gestation.  She underwent a primary low transverse  section for   a placental abruption, resulting in a  with cerebral palsy.  This is   her 2nd pregnancy.    SOCIAL HISTORY:  She denies use of any alcohol, tobacco, or recreational drug   use.    MEDICATIONS:  Prenatal vitamins.    ALLERGIES:  No known drug allergies.    PHYSICAL EXAMINATION:  VITAL SIGNS:  She is afebrile, hemodynamically stable.  HEART:  Regular rate and rhythm.  CHEST:  Clear to auscultation bilaterally.  ABDOMEN:  Soft, gravid, nontender.  PELVIC:  Sterile vaginal examination, closed, 50%, -2 station.  EXTREMITIES:  Nontender.    LABORATORY DATA:  Prenatal care labs are all in order.    ASSESSMENT AND PLAN:  A 35-year-old  3, para 0 at 39-6/7 weeks   gestation by excellent dates who now elects to proceed forward with a repeat   low transverse  section.       ____________________________________     MD MINISTERIO HUI / KYLE    DD:  05/10/2018 08:00:04  DT:  05/10/2018 09:22:00    D#:  [id number]  Job#:  [id number]

## 2018-06-03 ENCOUNTER — HOSPITAL ENCOUNTER (OUTPATIENT)
Facility: MEDICAL CENTER | Age: 28
End: 2018-06-03
Attending: SPECIALIST | Admitting: SPECIALIST
Payer: MEDICAID

## 2018-06-03 VITALS
HEART RATE: 76 BPM | TEMPERATURE: 97.8 F | DIASTOLIC BLOOD PRESSURE: 73 MMHG | HEIGHT: 62 IN | SYSTOLIC BLOOD PRESSURE: 130 MMHG | BODY MASS INDEX: 29.81 KG/M2 | WEIGHT: 162 LBS

## 2018-06-03 PROCEDURE — 59025 FETAL NON-STRESS TEST: CPT | Performed by: SPECIALIST

## 2018-06-03 NOTE — PROGRESS NOTES
EDC  39w2d. Pt presents to L&D with complaints of UC's. Pt taken to triage for assessment.     031 TOCO and EFM applied, VSS. Will take serial BP's for further assessment. Pt states that she started emeka around midnight tonight. Pt reports +FM, denies LOF or VB. Pt has a history of a c/s x1 and has a r c/s scheduled for . SVE 1/thick/kaur. Will update Dr. Adame on pt status.     325 Dr. Adame updated, recheck SVE in an hour and discharge home if no change.     0402 RN at bedside, SVE no change. Labor precautions given and all questions answered.     0410 Pt discharged home in stable condition.

## 2018-06-05 ENCOUNTER — HOSPITAL ENCOUNTER (INPATIENT)
Facility: MEDICAL CENTER | Age: 28
LOS: 3 days | End: 2018-06-08
Attending: SPECIALIST | Admitting: SPECIALIST
Payer: MEDICAID

## 2018-06-05 DIAGNOSIS — R10.2 FEMALE PELVIC PAIN: Primary | ICD-10-CM

## 2018-06-05 LAB
APTT PPP: 25.3 SEC (ref 24.7–36)
BASOPHILS # BLD AUTO: 0.2 % (ref 0–1.8)
BASOPHILS # BLD AUTO: 0.4 % (ref 0–1.8)
BASOPHILS # BLD: 0.02 K/UL (ref 0–0.12)
BASOPHILS # BLD: 0.04 K/UL (ref 0–0.12)
EOSINOPHIL # BLD AUTO: 0.04 K/UL (ref 0–0.51)
EOSINOPHIL # BLD AUTO: 0.06 K/UL (ref 0–0.51)
EOSINOPHIL NFR BLD: 0.3 % (ref 0–6.9)
EOSINOPHIL NFR BLD: 0.5 % (ref 0–6.9)
ERYTHROCYTE [DISTWIDTH] IN BLOOD BY AUTOMATED COUNT: 39.6 FL (ref 35.9–50)
ERYTHROCYTE [DISTWIDTH] IN BLOOD BY AUTOMATED COUNT: 40.1 FL (ref 35.9–50)
HCT VFR BLD AUTO: 27.1 % (ref 37–47)
HCT VFR BLD AUTO: 33.9 % (ref 37–47)
HGB BLD-MCNC: 11.1 G/DL (ref 12–16)
HGB BLD-MCNC: 9.1 G/DL (ref 12–16)
HOLDING TUBE BB 8507: NORMAL
IMM GRANULOCYTES # BLD AUTO: 0.03 K/UL (ref 0–0.11)
IMM GRANULOCYTES # BLD AUTO: 0.05 K/UL (ref 0–0.11)
IMM GRANULOCYTES NFR BLD AUTO: 0.3 % (ref 0–0.9)
IMM GRANULOCYTES NFR BLD AUTO: 0.4 % (ref 0–0.9)
INR PPP: 1.03 (ref 0.87–1.13)
LYMPHOCYTES # BLD AUTO: 2.26 K/UL (ref 1–4.8)
LYMPHOCYTES # BLD AUTO: 2.86 K/UL (ref 1–4.8)
LYMPHOCYTES NFR BLD: 18.8 % (ref 22–41)
LYMPHOCYTES NFR BLD: 25.4 % (ref 22–41)
MCH RBC QN AUTO: 26.1 PG (ref 27–33)
MCH RBC QN AUTO: 26.7 PG (ref 27–33)
MCHC RBC AUTO-ENTMCNC: 32.7 G/DL (ref 33.6–35)
MCHC RBC AUTO-ENTMCNC: 33.6 G/DL (ref 33.6–35)
MCV RBC AUTO: 79.5 FL (ref 81.4–97.8)
MCV RBC AUTO: 79.8 FL (ref 81.4–97.8)
MONOCYTES # BLD AUTO: 0.9 K/UL (ref 0–0.85)
MONOCYTES # BLD AUTO: 1.11 K/UL (ref 0–0.85)
MONOCYTES NFR BLD AUTO: 7.5 % (ref 0–13.4)
MONOCYTES NFR BLD AUTO: 9.9 % (ref 0–13.4)
NEUTROPHILS # BLD AUTO: 7.14 K/UL (ref 2–7.15)
NEUTROPHILS # BLD AUTO: 8.72 K/UL (ref 2–7.15)
NEUTROPHILS NFR BLD: 63.5 % (ref 44–72)
NEUTROPHILS NFR BLD: 72.8 % (ref 44–72)
NRBC # BLD AUTO: 0 K/UL
NRBC # BLD AUTO: 0 K/UL
NRBC BLD-RTO: 0 /100 WBC
NRBC BLD-RTO: 0 /100 WBC
PLATELET # BLD AUTO: 130 K/UL (ref 164–446)
PLATELET # BLD AUTO: 153 K/UL (ref 164–446)
PMV BLD AUTO: 10.4 FL (ref 9–12.9)
PMV BLD AUTO: 10.7 FL (ref 9–12.9)
PROTHROMBIN TIME: 13.2 SEC (ref 12–14.6)
RBC # BLD AUTO: 3.41 M/UL (ref 4.2–5.4)
RBC # BLD AUTO: 4.25 M/UL (ref 4.2–5.4)
WBC # BLD AUTO: 11.2 K/UL (ref 4.8–10.8)
WBC # BLD AUTO: 12 K/UL (ref 4.8–10.8)

## 2018-06-05 PROCEDURE — 700102 HCHG RX REV CODE 250 W/ 637 OVERRIDE(OP): Performed by: ANESTHESIOLOGY

## 2018-06-05 PROCEDURE — 85730 THROMBOPLASTIN TIME PARTIAL: CPT

## 2018-06-05 PROCEDURE — 700111 HCHG RX REV CODE 636 W/ 250 OVERRIDE (IP)

## 2018-06-05 PROCEDURE — 304966 HCHG RECOVERY SVSC TIME ADDL 1/2 HR: Performed by: SPECIALIST

## 2018-06-05 PROCEDURE — 700105 HCHG RX REV CODE 258: Performed by: SPECIALIST

## 2018-06-05 PROCEDURE — 306828 HCHG ANES-TIME GENERAL: Performed by: SPECIALIST

## 2018-06-05 PROCEDURE — 770002 HCHG ROOM/CARE - OB PRIVATE (112)

## 2018-06-05 PROCEDURE — 700105 HCHG RX REV CODE 258: Performed by: ANESTHESIOLOGY

## 2018-06-05 PROCEDURE — 700112 HCHG RX REV CODE 229: Performed by: SPECIALIST

## 2018-06-05 PROCEDURE — 700111 HCHG RX REV CODE 636 W/ 250 OVERRIDE (IP): Performed by: SPECIALIST

## 2018-06-05 PROCEDURE — 62273 INJECT EPIDURAL PATCH: CPT

## 2018-06-05 PROCEDURE — 85025 COMPLETE CBC W/AUTO DIFF WBC: CPT

## 2018-06-05 PROCEDURE — A9270 NON-COVERED ITEM OR SERVICE: HCPCS | Performed by: SPECIALIST

## 2018-06-05 PROCEDURE — 700111 HCHG RX REV CODE 636 W/ 250 OVERRIDE (IP): Performed by: ANESTHESIOLOGY

## 2018-06-05 PROCEDURE — 36415 COLL VENOUS BLD VENIPUNCTURE: CPT

## 2018-06-05 PROCEDURE — A9270 NON-COVERED ITEM OR SERVICE: HCPCS | Performed by: ANESTHESIOLOGY

## 2018-06-05 PROCEDURE — 304964 HCHG RECOVERY ROOM TIME 1HR: Performed by: SPECIALIST

## 2018-06-05 PROCEDURE — 59514 CESAREAN DELIVERY ONLY: CPT

## 2018-06-05 PROCEDURE — 700101 HCHG RX REV CODE 250

## 2018-06-05 PROCEDURE — 85610 PROTHROMBIN TIME: CPT

## 2018-06-05 PROCEDURE — 305385 HCHG SURGICAL SERVICES 1/4 HOUR: Performed by: SPECIALIST

## 2018-06-05 PROCEDURE — 700102 HCHG RX REV CODE 250 W/ 637 OVERRIDE(OP)

## 2018-06-05 RX ORDER — OXYCODONE HYDROCHLORIDE AND ACETAMINOPHEN 5; 325 MG/1; MG/1
1 TABLET ORAL EVERY 4 HOURS PRN
Status: DISCONTINUED | OUTPATIENT
Start: 2018-06-05 | End: 2018-06-06

## 2018-06-05 RX ORDER — METOCLOPRAMIDE HYDROCHLORIDE 5 MG/ML
10 INJECTION INTRAMUSCULAR; INTRAVENOUS ONCE
Status: COMPLETED | OUTPATIENT
Start: 2018-06-05 | End: 2018-06-05

## 2018-06-05 RX ORDER — KETOROLAC TROMETHAMINE 30 MG/ML
30 INJECTION, SOLUTION INTRAMUSCULAR; INTRAVENOUS EVERY 6 HOURS
Status: DISCONTINUED | OUTPATIENT
Start: 2018-06-05 | End: 2018-06-05

## 2018-06-05 RX ORDER — OXYCODONE AND ACETAMINOPHEN 10; 325 MG/1; MG/1
1 TABLET ORAL EVERY 4 HOURS PRN
Status: DISCONTINUED | OUTPATIENT
Start: 2018-06-05 | End: 2018-06-06

## 2018-06-05 RX ORDER — DOCUSATE SODIUM 100 MG/1
100 CAPSULE, LIQUID FILLED ORAL 2 TIMES DAILY PRN
Status: DISCONTINUED | OUTPATIENT
Start: 2018-06-05 | End: 2018-06-08 | Stop reason: HOSPADM

## 2018-06-05 RX ORDER — SODIUM CHLORIDE, SODIUM LACTATE, POTASSIUM CHLORIDE, CALCIUM CHLORIDE 600; 310; 30; 20 MG/100ML; MG/100ML; MG/100ML; MG/100ML
INJECTION, SOLUTION INTRAVENOUS CONTINUOUS
Status: DISCONTINUED | OUTPATIENT
Start: 2018-06-05 | End: 2018-06-05 | Stop reason: HOSPADM

## 2018-06-05 RX ORDER — DIPHENHYDRAMINE HYDROCHLORIDE 50 MG/ML
25 INJECTION INTRAMUSCULAR; INTRAVENOUS EVERY 6 HOURS PRN
Status: DISCONTINUED | OUTPATIENT
Start: 2018-06-05 | End: 2018-06-06

## 2018-06-05 RX ORDER — DIPHENHYDRAMINE HYDROCHLORIDE 50 MG/ML
12.5 INJECTION INTRAMUSCULAR; INTRAVENOUS EVERY 6 HOURS PRN
Status: DISCONTINUED | OUTPATIENT
Start: 2018-06-05 | End: 2018-06-06

## 2018-06-05 RX ORDER — CEFAZOLIN SODIUM 1 G/3ML
1 INJECTION, POWDER, FOR SOLUTION INTRAMUSCULAR; INTRAVENOUS ONCE
Status: COMPLETED | OUTPATIENT
Start: 2018-06-05 | End: 2018-06-05

## 2018-06-05 RX ORDER — SODIUM CHLORIDE, SODIUM GLUCONATE, SODIUM ACETATE, POTASSIUM CHLORIDE AND MAGNESIUM CHLORIDE 526; 502; 368; 37; 30 MG/100ML; MG/100ML; MG/100ML; MG/100ML; MG/100ML
1500 INJECTION, SOLUTION INTRAVENOUS ONCE
Status: COMPLETED | OUTPATIENT
Start: 2018-06-05 | End: 2018-06-05

## 2018-06-05 RX ORDER — MISOPROSTOL 200 UG/1
800 TABLET ORAL
Status: DISCONTINUED | OUTPATIENT
Start: 2018-06-05 | End: 2018-06-05 | Stop reason: HOSPADM

## 2018-06-05 RX ORDER — ONDANSETRON 2 MG/ML
4 INJECTION INTRAMUSCULAR; INTRAVENOUS EVERY 6 HOURS PRN
Status: DISCONTINUED | OUTPATIENT
Start: 2018-06-05 | End: 2018-06-06

## 2018-06-05 RX ORDER — METOCLOPRAMIDE HYDROCHLORIDE 5 MG/ML
10 INJECTION INTRAMUSCULAR; INTRAVENOUS EVERY 6 HOURS PRN
Status: DISCONTINUED | OUTPATIENT
Start: 2018-06-05 | End: 2018-06-06

## 2018-06-05 RX ORDER — HYDROMORPHONE HYDROCHLORIDE 2 MG/ML
0.4 INJECTION, SOLUTION INTRAMUSCULAR; INTRAVENOUS; SUBCUTANEOUS
Status: DISCONTINUED | OUTPATIENT
Start: 2018-06-05 | End: 2018-06-06

## 2018-06-05 RX ORDER — CITRIC ACID/SODIUM CITRATE 334-500MG
30 SOLUTION, ORAL ORAL ONCE
Status: COMPLETED | OUTPATIENT
Start: 2018-06-05 | End: 2018-06-05

## 2018-06-05 RX ORDER — DEXTROSE AND SODIUM CHLORIDE 5; .45 G/100ML; G/100ML
INJECTION, SOLUTION INTRAVENOUS CONTINUOUS
Status: DISCONTINUED | OUTPATIENT
Start: 2018-06-05 | End: 2018-06-05 | Stop reason: HOSPADM

## 2018-06-05 RX ORDER — SODIUM CHLORIDE, SODIUM LACTATE, POTASSIUM CHLORIDE, CALCIUM CHLORIDE 600; 310; 30; 20 MG/100ML; MG/100ML; MG/100ML; MG/100ML
INJECTION, SOLUTION INTRAVENOUS PRN
Status: DISCONTINUED | OUTPATIENT
Start: 2018-06-05 | End: 2018-06-08 | Stop reason: HOSPADM

## 2018-06-05 RX ORDER — KETOROLAC TROMETHAMINE 30 MG/ML
INJECTION, SOLUTION INTRAMUSCULAR; INTRAVENOUS
Status: COMPLETED
Start: 2018-06-05 | End: 2018-06-05

## 2018-06-05 RX ORDER — SIMETHICONE 80 MG
80 TABLET,CHEWABLE ORAL 4 TIMES DAILY PRN
Status: DISCONTINUED | OUTPATIENT
Start: 2018-06-05 | End: 2018-06-08 | Stop reason: HOSPADM

## 2018-06-05 RX ORDER — NALOXONE HYDROCHLORIDE 0.4 MG/ML
0.1 INJECTION, SOLUTION INTRAMUSCULAR; INTRAVENOUS; SUBCUTANEOUS PRN
Status: DISCONTINUED | OUTPATIENT
Start: 2018-06-05 | End: 2018-06-06

## 2018-06-05 RX ADMIN — OXYCODONE HYDROCHLORIDE AND ACETAMINOPHEN 1 TABLET: 10; 325 TABLET ORAL at 12:36

## 2018-06-05 RX ADMIN — CEFAZOLIN 1 G: 330 INJECTION, POWDER, FOR SOLUTION INTRAMUSCULAR; INTRAVENOUS at 03:15

## 2018-06-05 RX ADMIN — SODIUM CHLORIDE, SODIUM GLUCONATE, SODIUM ACETATE, POTASSIUM CHLORIDE AND MAGNESIUM CHLORIDE 1000 ML: 526; 502; 368; 37; 30 INJECTION, SOLUTION INTRAVENOUS at 02:15

## 2018-06-05 RX ADMIN — DOCUSATE SODIUM 100 MG: 100 CAPSULE ORAL at 21:47

## 2018-06-05 RX ADMIN — ONDANSETRON 4 MG: 2 INJECTION INTRAMUSCULAR; INTRAVENOUS at 14:40

## 2018-06-05 RX ADMIN — METOCLOPRAMIDE 10 MG: 5 INJECTION, SOLUTION INTRAMUSCULAR; INTRAVENOUS at 02:36

## 2018-06-05 RX ADMIN — FAMOTIDINE 20 MG: 10 INJECTION, SOLUTION INTRAVENOUS at 02:36

## 2018-06-05 RX ADMIN — Medication 2000 ML/HR: at 03:45

## 2018-06-05 RX ADMIN — SODIUM CHLORIDE, POTASSIUM CHLORIDE, SODIUM LACTATE AND CALCIUM CHLORIDE: 600; 310; 30; 20 INJECTION, SOLUTION INTRAVENOUS at 11:17

## 2018-06-05 RX ADMIN — OXYCODONE HYDROCHLORIDE AND ACETAMINOPHEN 1 TABLET: 5; 325 TABLET ORAL at 08:27

## 2018-06-05 RX ADMIN — KETOROLAC TROMETHAMINE 60 MG: 30 INJECTION, SOLUTION INTRAMUSCULAR at 05:28

## 2018-06-05 RX ADMIN — SODIUM CITRATE AND CITRIC ACID MONOHYDRATE 30 ML: 500; 334 SOLUTION ORAL at 02:39

## 2018-06-05 RX ADMIN — OXYCODONE HYDROCHLORIDE AND ACETAMINOPHEN 1 TABLET: 5; 325 TABLET ORAL at 17:32

## 2018-06-05 RX ADMIN — OXYCODONE HYDROCHLORIDE AND ACETAMINOPHEN 1 TABLET: 5; 325 TABLET ORAL at 21:44

## 2018-06-05 RX ADMIN — OXYTOCIN 125 ML/HR: 10 INJECTION, SOLUTION INTRAMUSCULAR; INTRAVENOUS at 05:30

## 2018-06-05 ASSESSMENT — PAIN SCALES - GENERAL
PAINLEVEL_OUTOF10: 1
PAINLEVEL_OUTOF10: 3
PAINLEVEL_OUTOF10: 0
PAINLEVEL_OUTOF10: 5
PAINLEVEL_OUTOF10: 5
PAINLEVEL_OUTOF10: 3
PAINLEVEL_OUTOF10: 3
PAINLEVEL_OUTOF10: 2
PAINLEVEL_OUTOF10: 3
PAINLEVEL_OUTOF10: 3
PAINLEVEL_OUTOF10: 0
PAINLEVEL_OUTOF10: 7
PAINLEVEL_OUTOF10: 2
PAINLEVEL_OUTOF10: 3
PAINLEVEL_OUTOF10: 5

## 2018-06-05 ASSESSMENT — LIFESTYLE VARIABLES: ALCOHOL_USE: NO

## 2018-06-05 ASSESSMENT — PATIENT HEALTH QUESTIONNAIRE - PHQ9
1. LITTLE INTEREST OR PLEASURE IN DOING THINGS: NOT AT ALL
SUM OF ALL RESPONSES TO PHQ9 QUESTIONS 1 AND 2: 0
2. FEELING DOWN, DEPRESSED, IRRITABLE, OR HOPELESS: NOT AT ALL

## 2018-06-05 NOTE — PROGRESS NOTES
0200 Assumed pt care. Admission assessment done  0215 IV started  0248 Monitors off for abd prep wipes  0300 pt ambulated to OR   0425 Pt arrived in PACU  0530 Rebeca care done, new pad to pt. Bates care complete  0600 Pt transferred to PP room 330 via gurney.  [place]. Pt tolerated well.   0610 Report to Tootie ANDRADE RN

## 2018-06-05 NOTE — PROGRESS NOTES
0710-Bedside report received from Tootie Garrett RN. Drainage noted on Mepilex dressing. Boarders of drainage area marked and will observe. Assumed care of patient who denies any needs at this time.

## 2018-06-05 NOTE — OR SURGEON
Immediate Post OP Note    PreOp Diagnosis:   1.)  Intrauterine pregnancy at 39 weeks and 4 days gestation.  2.)  Previous  section  3.)  Spontaneous rupture membranes  4.)  Early labor    PostOp Diagnosis:   1.)  Intrauterine pregnancy at 39 weeks and 4 days gestation.  2.)  Previous  section  3.)  Spontaneous rupture membranes  4.)  Early labor  5.)  Live term male  with Apgar scores of 8 and 9 at one and five minutes respectively and a  weight of 3,755 grams    Procedure(s):  REPEAT C SECTION - Wound Class: Clean Contaminated    Surgeon(s):  SURESH Keen M.D.    Anesthesiologist/Type of Anesthesia:  Anesthesiologist: Dereck Watson III, M.D./Spinal    Surgical Staff:  Circulator: Jessica Marquez R.N.  Scrub Person: Tamika Piper  L&SONAL Baby  Nurse: Kendal Hendrix R.N.    Specimens removed if any:  None    Estimated Blood Loss:   Approximately 700 mL    Findings:   Live term male  with Apgar scores of 8 and 9 at one and five minutes respectively and a  weight of 3,755 grams  Normal uterus normal 4 be to bilaterally normal ovaries bilaterally    Complications:   None        2018 4:22 AM Clemente Adame M.D.

## 2018-06-05 NOTE — OP REPORT
DATE OF SERVICE:  2018    PREOPERATIVE DIAGNOSES:  1.  Intrauterine pregnancy at 39 weeks and 4 days gestation.  2.  Previous  section.  3.  Spontaneous rupture of membranes.  4.  Early labor.    POSTOPERATIVE DIAGNOSES:  1.  Intrauterine pregnancy at 39 weeks and 4 days gestation.  2.  Previous  section.  3.  Spontaneous rupture of membranes.  4.  Early labor.  5.  Live term male  with Apgar scores of 8 and 9 at one and five   minutes respectively and a  weight of 3,755 grams.    PROCEDURE:  Repeat low transverse  section.    SURGEON:  Clemente Adame MD    ASSISTANT:  Chloe العراقي MD    ANESTHESIA:  Spinal anesthesia.    ANESTHESIOLOGIST:  Dereck Watson MD    FINDINGS:  1.  Live term male  with Apgar scores of 8 and 9 at 1 and 5 minutes   respectively and  weight of 3,755 grams.  2.  Normal uterus, normal fallopian tubes bilaterally, and normal ovaries   bilaterally.    SPECIMENS:  None.    COMPLICATIONS:  None.    ESTIMATED BLOOD LOSS:  Approximately 700 mL.    DESCRIPTION OF PROCEDURE:  After appropriate consents have been obtained, the   patient was taken to the operating room and given spinal anesthesia.  She was   prepped and draped in the dorsal supine position and a Bates catheter was   noted to be in place and draining urine.  Anesthesia was tested and noted to   be excellent.  The lower abdominal horizontal surgical scar (Pfannenstiel   scar) was resected using a scalpel, in the usual fashion, and thus a   Pfannenstiel incision was created.  This incision continued deeply through the   subcutaneous tissues using Bovie electrocautery, hemostasis was maintained   with Bovie electrocautery.  Anterior rectus fascia was identified and incised   transversely with Bovie electrocautery and this incision was extended   bilaterally with Bovie electrocautery.  The superior aspect of the fascial   incision was doubly grasped with Kocher clamps and  undermined from the   underlying rectus muscles both blunt dissection and Bovie electrocautery.    Next, the inferior aspect of the fascial incision was similarly doubly grasped   with Kocher clamps and undermined from the underlying rectus muscles both   blunt dissection and Bovie electrocautery.  The midline of the rectus muscles   identified and the rectus muscles grasped on either side of the midline with   Chuyita clamps and elevated and a vertical incision was made in the midline of   the rectus muscle using Bovie electrocautery and this incision was made very   gently and carefully and in doing so, the parietal peritoneum was also   entered.  The incision in the midline of the rectus muscle was continued   superiorly and inferiorly with Bovie electrocautery with care taken to avoid   the bladder.  The incision in the parietal peritoneum was extended superiorly   and inferiorly with Bovie electrocautery with care taken to avoid the bladder.    Retractors were introduced to expose the anterior lower uterine segment and   the serosa overlying this was incised with Bovie electrocautery and this   incision extended bilaterally with Bovie electrocautery and the bladder was   dissected away bluntly, meticulously, with blunt dissection and Bovie   electrocautery.  Retractors were introduced/adjusted to re-expose the anterior   lower uterine segment, which was incised transversely with the scalpel and   bulging membranes were seen.  The hysterotomy was extended bilaterally with   bandage scissors.  Rupture of membranes revealed clear fluid.  A hand was   placed in the intrauterine cavity around baby's head as retractors were   removed and fundal pressure used to easily deliver baby's head.  Baby's   nasopharynx was suctioned with bulb syringe.  Continued fundal pressure was   used to easily deliver baby's body.  Baby's nasopharynx was further suctioned   with bulb syringes.  The umbilical cord was doubly clamped and cut  and then   baby was handed to the awaiting nursery team.  The placenta was manually   removed.  The uterus was exteriorized and the interior of the uterus was wiped   clean of products of conception.  Cervix was dilated with a pair of ring   forceps and this pair of ring forceps was then discarded.  The hysterotomy was   reapproximated first with placement of 4 interrupted mattress sutures of #1   chromic in a continuous interlocking suture of #1 chromic.  The entire length   of the hysterotomy repair was examined and hemostasis was noted to be   excellent.  The uterus and both tubes and both ovaries were examined and noted   to be normal.  The uterus was replaced in the peritoneal cavity.  Retractors   were introduced to expose the anterior lower uterine segment and the entire   length of the hysterotomy repair was examined and there was some bleeding seen   coming from the superior aspect in the middle and this was controlled with   Bovie electrocautery.  The entire length of the hysterotomy repair was again   examined at this time, hemostasis was noted to be excellent.  Retractors were   removed.  Lap and needle counts reported to be correct at this time.  The   parietal peritoneum was reapproximated with simple continuous suture using 3-0   Vicryl.  The rectus muscle was reapproximated in midline with placement of a   few interrupted mattress sutures of 2-0 chromic.  Hemostasis was noted to be   excellent.  The anterior rectus fascia was identified and reapproximated with   simple continuous suture using #1 Vicryl.  The wound was copiously irrigated   and drained and hemostasis was noted to be excellent.  The subcutaneous   tissues were reapproximated with simple continuous suture using 3-0 Vicryl.    Finally, the skin was reapproximated with placement of many interrupted buried   dissolvable staples (Insorb) and the skin incision was reapproximated nicely   in this way.  The procedure was terminated.  Final lap  and needle counts   reported to be correct x2 at the end of the procedure.  The patient tolerated   the procedure well and sent to postanesthesia recovery in stable condition.       ____________________________________     NICK OROZCO MD    MED / [facility]    DD:  06/05/2018 04:32:54  DT:  06/05/2018 04:52:22    D#:  [id number]  Job#:  [id number]    cc: Chloe العراقي MD, Dereck Watson MD, EFRAÍN RAMIREZ MD

## 2018-06-05 NOTE — PROGRESS NOTES
Spoke with Dr. Adame regarding drainage on Mepilex dressing and low urine output. Orders given for lactated ringers fluid bolus and CBC with differential, PTT and INR.

## 2018-06-05 NOTE — H&P
DATE OF PROCEDURE:  2018.    IDENTIFICATION:  The patient is a very pleasant 28-year-old primipara (   2, para 1 with 1 previous  section) who is today 39 weeks and 4 days   gestation.    CHIEF COMPLAINT:  Leakage of fluid per vagina accompanied by uterine   contractions.    HISTORY OF PRESENT ILLNESS:  The patient has had her prenatal care with Dr. Sanders during the course of this pregnancy and her dates were established   based on a week ultrasound and her due date was established as 2018,   making her today 39 weeks and 4 days gestation.  She within the last few hours   began having leakage of fluid per vagina and then started having frequent and   painful uterine contractions.  On presentation to labor and delivery, obvious   evidence of spontaneous rupture of membranes was noted.  She actually had   been scheduled to have a repeat  section on Thursday (the day after   tomorrow), namely 2018).  The fetal heart tracing is reassuring.  We   will proceed with repeat  section this morning.  She presented very   early this morning at about 1-2 o'clock this morning with the aforementioned   complaints.  I discussed with her and explained to her in detail at length   what repeat  section is and what a repeat  section involves   and I discussed with her the risks and benefits and alternatives and after our   discussions and after answering her questions, she said that she would like   for us to proceed with repeat  section.    PAST MEDICAL HISTORY:  No medical illnesses.    PAST SURGICAL HISTORY:  The patient has had a previous  section and   also placement of PE tubes.    MEDICATIONS:  None other than for vitamins.    ALLERGIES:  None.    MEDICATIONS:  No known drug allergies.    SOCIAL HISTORY:  The patient used to smoke cigarettes and used to smoke   marijuana, but quit both of these.  She denies consumption of alcoholic   beverages  during this pregnancy.    REVIEW OF SYSTEMS:  GENERAL:  No fevers or chills or sweats.  PULMONARY:  No coughing or wheezing or chest pain or shortness of breath.  CARDIOVASCULAR:  No palpitations or dyspnea or chest pain.  GASTROINTESTINAL:  No nausea, vomiting, diarrhea, constipation.  GENITOURINARY:  The patient complains of leakage of fluid per vagina and   uterine contractions.  MUSCULOSKELETAL:  No arthralgias or myalgias other than for hip pain and low   back pain.  NEUROLOGIC:  No headaches or syncope or seizures.    PHYSICAL EXAMINATION:  VITAL SIGNS:  The patient's vital signs are stable and she is afebrile.  GENERAL:  Well-developed, well-nourished, in no apparent distress.  CHEST AND HEART:  Regular rate and rhythm.  No murmur.  LUNGS:  Clear to auscultation bilaterally.  ABDOMEN:  Gravid about 9 months' size.  There is a Pfannenstiel scar.  EXTREMITIES:  No clubbing, cyanosis or edema except for some mild bilateral   symmetrical lower extremity edema consistent with term pregnancy.  NEUROLOGICAL:  Nonfocal.    ASSESSMENT:  1.  Intrauterine pregnancy at 39 weeks and 4 days gestation.  2.  Spontaneous rupture of membranes.  3.  Early labor.  4.  Previous  section.  The patient wished to be delivered by a repeat    section.    PLAN:  We will proceed with repeat  section.  Please see above.       ____________________________________     NICK OROZCO MD    MED / [facility]    DD:  2018 02:36:51  DT:  2018 04:43:21    D#:  [id number]  Job#:  [id number]    cc: EFRAÍN RAMIREZ MD

## 2018-06-05 NOTE — CONSULTS
Nipples intact, everted. Mother has Medicaid & [address] [place] [address] WIC. Mother plans to follow-up with WIC once discharged. Assisted baby to right breast using cross cradle hold, skin to skin, observed deep latch, mother denies pain with latch.     Teaching on hunger cues, breastfeeding when baby shows cues or by 3 hours from last feed, importance of skin to skin, cluster feeding & hand expression.     Breastfeeding POC:  Breastfeed on demand or by 3 hours from last feed, lots of skin to skin.

## 2018-06-05 NOTE — PROGRESS NOTES
Pt admitted from L&D, bedside report received.  Assessment done.  Pt oriented to room and educated on use of call light, emergency light and we care system.  POC discussed, pt will request pain medication as needed.  FOB and infant at the bedside.  Questions answered, all needs attended to.

## 2018-06-05 NOTE — PROGRESS NOTES
0136- EDC 2018 39.4 weeks presents to L&D with c/o of SROM at 0100. Pt leaking all over floor. TOCO/FM applied. SVE=2cm  Dr. Adame called and updated on pt. Will prep pt for repeat c/s  215-Report given Jessica AGUILAR

## 2018-06-06 LAB
ERYTHROCYTE [DISTWIDTH] IN BLOOD BY AUTOMATED COUNT: 40.4 FL (ref 35.9–50)
HCT VFR BLD AUTO: 25.1 % (ref 37–47)
HGB BLD-MCNC: 8.1 G/DL (ref 12–16)
MCH RBC QN AUTO: 26.1 PG (ref 27–33)
MCHC RBC AUTO-ENTMCNC: 32.3 G/DL (ref 33.6–35)
MCV RBC AUTO: 81 FL (ref 81.4–97.8)
PLATELET # BLD AUTO: 143 K/UL (ref 164–446)
PMV BLD AUTO: 10.1 FL (ref 9–12.9)
RBC # BLD AUTO: 3.1 M/UL (ref 4.2–5.4)
WBC # BLD AUTO: 9.5 K/UL (ref 4.8–10.8)

## 2018-06-06 PROCEDURE — 85027 COMPLETE CBC AUTOMATED: CPT

## 2018-06-06 PROCEDURE — 700102 HCHG RX REV CODE 250 W/ 637 OVERRIDE(OP): Performed by: SPECIALIST

## 2018-06-06 PROCEDURE — A9270 NON-COVERED ITEM OR SERVICE: HCPCS | Performed by: SPECIALIST

## 2018-06-06 PROCEDURE — 700112 HCHG RX REV CODE 229: Performed by: SPECIALIST

## 2018-06-06 PROCEDURE — 700111 HCHG RX REV CODE 636 W/ 250 OVERRIDE (IP): Performed by: SPECIALIST

## 2018-06-06 PROCEDURE — 770002 HCHG ROOM/CARE - OB PRIVATE (112)

## 2018-06-06 PROCEDURE — 36415 COLL VENOUS BLD VENIPUNCTURE: CPT

## 2018-06-06 PROCEDURE — 3E0234Z INTRODUCTION OF SERUM, TOXOID AND VACCINE INTO MUSCLE, PERCUTANEOUS APPROACH: ICD-10-PCS | Performed by: SPECIALIST

## 2018-06-06 PROCEDURE — A9270 NON-COVERED ITEM OR SERVICE: HCPCS | Performed by: ANESTHESIOLOGY

## 2018-06-06 PROCEDURE — 700102 HCHG RX REV CODE 250 W/ 637 OVERRIDE(OP): Performed by: ANESTHESIOLOGY

## 2018-06-06 PROCEDURE — 90471 IMMUNIZATION ADMIN: CPT

## 2018-06-06 PROCEDURE — 90715 TDAP VACCINE 7 YRS/> IM: CPT

## 2018-06-06 PROCEDURE — 90707 MMR VACCINE SC: CPT | Performed by: SPECIALIST

## 2018-06-06 PROCEDURE — 700112 HCHG RX REV CODE 229

## 2018-06-06 RX ORDER — ONDANSETRON 2 MG/ML
4 INJECTION INTRAMUSCULAR; INTRAVENOUS EVERY 6 HOURS PRN
Status: DISCONTINUED | OUTPATIENT
Start: 2018-06-06 | End: 2018-06-08 | Stop reason: HOSPADM

## 2018-06-06 RX ORDER — MORPHINE SULFATE 4 MG/ML
4 INJECTION, SOLUTION INTRAMUSCULAR; INTRAVENOUS
Status: DISCONTINUED | OUTPATIENT
Start: 2018-06-06 | End: 2018-06-08 | Stop reason: HOSPADM

## 2018-06-06 RX ORDER — OXYCODONE HYDROCHLORIDE AND ACETAMINOPHEN 5; 325 MG/1; MG/1
1 TABLET ORAL EVERY 4 HOURS PRN
Status: DISCONTINUED | OUTPATIENT
Start: 2018-06-06 | End: 2018-06-08 | Stop reason: HOSPADM

## 2018-06-06 RX ORDER — OXYCODONE AND ACETAMINOPHEN 10; 325 MG/1; MG/1
1 TABLET ORAL EVERY 4 HOURS PRN
Status: DISCONTINUED | OUTPATIENT
Start: 2018-06-06 | End: 2018-06-08 | Stop reason: HOSPADM

## 2018-06-06 RX ORDER — ONDANSETRON 4 MG/1
4 TABLET, ORALLY DISINTEGRATING ORAL EVERY 6 HOURS PRN
Status: DISCONTINUED | OUTPATIENT
Start: 2018-06-06 | End: 2018-06-08 | Stop reason: HOSPADM

## 2018-06-06 RX ORDER — ACETAMINOPHEN 325 MG/1
325 TABLET ORAL EVERY 4 HOURS PRN
Status: DISCONTINUED | OUTPATIENT
Start: 2018-06-06 | End: 2018-06-08 | Stop reason: HOSPADM

## 2018-06-06 RX ORDER — IBUPROFEN 600 MG/1
600 TABLET ORAL EVERY 6 HOURS PRN
Status: DISCONTINUED | OUTPATIENT
Start: 2018-06-06 | End: 2018-06-08 | Stop reason: HOSPADM

## 2018-06-06 RX ADMIN — TETANUS TOXOID, REDUCED DIPHTHERIA TOXOID AND ACELLULAR PERTUSSIS VACCINE, ADSORBED 0.5 ML: 5; 2.5; 8; 8; 2.5 SUSPENSION INTRAMUSCULAR at 23:37

## 2018-06-06 RX ADMIN — OXYCODONE HYDROCHLORIDE AND ACETAMINOPHEN 1 TABLET: 5; 325 TABLET ORAL at 06:27

## 2018-06-06 RX ADMIN — DOCUSATE SODIUM 100 MG: 100 CAPSULE ORAL at 23:43

## 2018-06-06 RX ADMIN — OXYCODONE HYDROCHLORIDE AND ACETAMINOPHEN 1 TABLET: 10; 325 TABLET ORAL at 23:32

## 2018-06-06 RX ADMIN — OXYCODONE HYDROCHLORIDE AND ACETAMINOPHEN 1 TABLET: 10; 325 TABLET ORAL at 10:24

## 2018-06-06 RX ADMIN — OXYCODONE HYDROCHLORIDE AND ACETAMINOPHEN 1 TABLET: 10; 325 TABLET ORAL at 14:49

## 2018-06-06 RX ADMIN — OXYCODONE HYDROCHLORIDE AND ACETAMINOPHEN 1 TABLET: 10; 325 TABLET ORAL at 19:14

## 2018-06-06 RX ADMIN — DOCUSATE SODIUM 100 MG: 100 CAPSULE ORAL at 10:25

## 2018-06-06 RX ADMIN — OXYCODONE HYDROCHLORIDE AND ACETAMINOPHEN 1 TABLET: 10; 325 TABLET ORAL at 02:38

## 2018-06-06 RX ADMIN — SIMETHICONE CHEW TAB 80 MG 80 MG: 80 TABLET ORAL at 23:43

## 2018-06-06 RX ADMIN — MEASLES, MUMPS, AND RUBELLA VIRUS VACCINE LIVE 0.5 ML: 1000; 12500; 1000 INJECTION, POWDER, LYOPHILIZED, FOR SUSPENSION SUBCUTANEOUS at 23:35

## 2018-06-06 RX ADMIN — SIMETHICONE CHEW TAB 80 MG 80 MG: 80 TABLET ORAL at 10:25

## 2018-06-06 ASSESSMENT — PAIN SCALES - GENERAL
PAINLEVEL_OUTOF10: 7
PAINLEVEL_OUTOF10: 7
PAINLEVEL_OUTOF10: 3
PAINLEVEL_OUTOF10: 7
PAINLEVEL_OUTOF10: 2
PAINLEVEL_OUTOF10: 7
PAINLEVEL_OUTOF10: 2
PAINLEVEL_OUTOF10: 3
PAINLEVEL_OUTOF10: 8
PAINLEVEL_OUTOF10: 5
PAINLEVEL_OUTOF10: 3
PAINLEVEL_OUTOF10: 4

## 2018-06-06 NOTE — PROGRESS NOTES
Telephone call received from Dr. Adame regarding orders for lab work. Telephone order received from Dr. Adame for CBC for AM of 6/7/18

## 2018-06-06 NOTE — CARE PLAN
Problem: Altered physiologic condition related to postoperative  delivery  Goal: Patient physiologically stable as evidenced by normal lochia, palpable uterine involution and vital signs within normal limits  Outcome: PROGRESSING AS EXPECTED  Fundus is firm, lochia is light and vital signs are stable. Will continue to monitor with Q6 hour checks and hourly rounding      Problem: Potential for postpartum infection related to surgical incision, compromised uterine condition, urinary tract or respiratory compromise  Goal: Patient will be afebrile and free from signs and symptoms of infection  Outcome: PROGRESSING AS EXPECTED  Patient does not exhibit any signs/symptoms of infection. Will continue to monitor with Q6 hour checks and hourly rounding
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
## 2018-06-06 NOTE — CARE PLAN
Problem: Altered physiologic condition related to postoperative  delivery  Goal: Patient physiologically stable as evidenced by normal lochia, palpable uterine involution and vital signs within normal limits  Outcome: PROGRESSING AS EXPECTED  Fundus is firm at umbilicus midline and lochia is light rubra.    Problem: Alteration in comfort related to surgical incision and/or after birth pains  Goal: Patient verbalizes acceptable pain level  Outcome: PROGRESSING AS EXPECTED  Patient states acceptable incisional pain relief from Percocet. Patient assisted out of bed at 1530 to bathroom and shown pericare. Gait slow but steady.

## 2018-06-06 NOTE — PROGRESS NOTES
Telephone call to Dr. Adame regarding orders for repeat lab work tomorrow morning. Message left, awaiting call back.

## 2018-06-06 NOTE — PROGRESS NOTES
Mother's breasts are beginning to fill, infant latched to both breasts with audible swallows, sustained feed for 30 minutes. Mother had been pumping and supplementing with donor milk, no indication to supplement observed feeding, discussed only pumping and supplementation for sub-optimal feedings. Lactation to follow as needed.
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
## 2018-06-06 NOTE — PROGRESS NOTES
0700 - Bedside report received from LAUREN Guerra. Patient care assumed. Chart and orders reviewed  0830 - Pt assessment complete, wnl. Fundus firm with minimal discharge. Pt ambulating to bathroom and voiding without difficulty. Patient denies any dizziness or lightheadedness at this time. Patient denies any calf pain or tenderness at this time. Reviewed use of emergency light. Plan of care discussed with patient for the day. Pain medication plan discussed with patient; patient requesting PRN pain medication ATC. All questions/concerns addressed at this time. Encouraged to call with needs, call light within reach. Will monitor

## 2018-06-06 NOTE — PROGRESS NOTES
The patient is today postoperative day #1 status post repeat low transverse  section.  She says she has some abdominal discomfort. She denies any heavy vaginal bleeding.  She says she is ambulating and urinating and tolerating oral fluids and she says she has begun to pass flatus.  The patient's vital signs are stable and she is afebrile. This morning her pulse ox is 95% on room air and her temperature this morning is 98.2° and her respiratory rate this morning and 19 g/m and her heart rate is 85 bpm and her blood pressure this morning is 115/69.  The patient appears well-developed and well-nourished and relaxed and alert and comfortable and in no apparent distress.  The patient's preoperative hemoglobin yesterday morning at 2:15 AM was 11.1 g/dL. Her postoperative hemoglobin later in the morning yesterday at 11:17 AM yesterday was 9.1 g/dL. Early this morning at about 12 minutes after midnight this morning her hemoglobin was 8.1 g/dL. This morning her platelet count was 143,000 and her white count was 9.5.  Of note yesterday morning at 11:17 AM her PTT was normal at 13.2 seconds and her INR is normal at 1.03 and her PTT was normal at 25.3 seconds.  The nurses called me yesterday saying that she noticed increased blood on the patient's abdominal wound dressing. I had asked her to order the aforementioned labs and she did.  We will check another set of labs tomorrow morning.  Clemente Adame M.D.
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
## 2018-06-07 LAB
ERYTHROCYTE [DISTWIDTH] IN BLOOD BY AUTOMATED COUNT: 41.5 FL (ref 35.9–50)
HCT VFR BLD AUTO: 24.6 % (ref 37–47)
HGB BLD-MCNC: 8.1 G/DL (ref 12–16)
MCH RBC QN AUTO: 26.8 PG (ref 27–33)
MCHC RBC AUTO-ENTMCNC: 32.9 G/DL (ref 33.6–35)
MCV RBC AUTO: 81.5 FL (ref 81.4–97.8)
PLATELET # BLD AUTO: 153 K/UL (ref 164–446)
PMV BLD AUTO: 9.9 FL (ref 9–12.9)
RBC # BLD AUTO: 3.02 M/UL (ref 4.2–5.4)
WBC # BLD AUTO: 9.7 K/UL (ref 4.8–10.8)

## 2018-06-07 PROCEDURE — 85027 COMPLETE CBC AUTOMATED: CPT

## 2018-06-07 PROCEDURE — 770002 HCHG ROOM/CARE - OB PRIVATE (112)

## 2018-06-07 PROCEDURE — 36415 COLL VENOUS BLD VENIPUNCTURE: CPT

## 2018-06-07 PROCEDURE — A9270 NON-COVERED ITEM OR SERVICE: HCPCS | Performed by: SPECIALIST

## 2018-06-07 PROCEDURE — 700102 HCHG RX REV CODE 250 W/ 637 OVERRIDE(OP): Performed by: SPECIALIST

## 2018-06-07 PROCEDURE — 700112 HCHG RX REV CODE 229: Performed by: SPECIALIST

## 2018-06-07 RX ADMIN — OXYCODONE HYDROCHLORIDE AND ACETAMINOPHEN 1 TABLET: 10; 325 TABLET ORAL at 16:59

## 2018-06-07 RX ADMIN — IBUPROFEN 600 MG: 600 TABLET, FILM COATED ORAL at 21:05

## 2018-06-07 RX ADMIN — OXYCODONE HYDROCHLORIDE AND ACETAMINOPHEN 1 TABLET: 5; 325 TABLET ORAL at 12:29

## 2018-06-07 RX ADMIN — OXYCODONE HYDROCHLORIDE AND ACETAMINOPHEN 1 TABLET: 10; 325 TABLET ORAL at 08:27

## 2018-06-07 RX ADMIN — OXYCODONE HYDROCHLORIDE AND ACETAMINOPHEN 1 TABLET: 10; 325 TABLET ORAL at 21:05

## 2018-06-07 RX ADMIN — IBUPROFEN 600 MG: 600 TABLET, FILM COATED ORAL at 08:27

## 2018-06-07 RX ADMIN — OXYCODONE HYDROCHLORIDE AND ACETAMINOPHEN 1 TABLET: 10; 325 TABLET ORAL at 03:37

## 2018-06-07 RX ADMIN — DOCUSATE SODIUM 100 MG: 100 CAPSULE ORAL at 21:05

## 2018-06-07 RX ADMIN — IBUPROFEN 600 MG: 600 TABLET, FILM COATED ORAL at 14:49

## 2018-06-07 ASSESSMENT — PAIN SCALES - GENERAL
PAINLEVEL_OUTOF10: 7
PAINLEVEL_OUTOF10: 5
PAINLEVEL_OUTOF10: 5
PAINLEVEL_OUTOF10: 4
PAINLEVEL_OUTOF10: 3
PAINLEVEL_OUTOF10: 3
PAINLEVEL_OUTOF10: 4
PAINLEVEL_OUTOF10: 7
PAINLEVEL_OUTOF10: 5
PAINLEVEL_OUTOF10: 7
PAINLEVEL_OUTOF10: 3

## 2018-06-07 NOTE — PROGRESS NOTES
Mother reports baby is now breastfeeding well, cluster fed last night. Baby fussy, observed mother place baby to breast independently using football hold, observed deep latch. Mother reports baby latching well and no longer supplementing & pumping. Mother has [address] [place] [address] WIC and plans to follow-up with WIC when discharged.     Reinforced hunger cues, breastfeeding baby when baby shows cues or by 3 hours from last feed, importance of skin to skin & hand expression.     Breastfeeding POC:  Breastfeed on demand or by 3 hours from last feed, lots of skin to skin.

## 2018-06-07 NOTE — PROGRESS NOTES
Patient caring for  with significant other at the bedside. Breast feeding, Improved latching by infant this evening. Pain control per MAR. Ambulating, tolerating diet. Drainage to dressing has not increased. Updated on POC, verbalizes understanding.

## 2018-06-07 NOTE — CARE PLAN
Problem: Altered physiologic condition related to postoperative  delivery  Goal: Patient physiologically stable as evidenced by normal lochia, palpable uterine involution and vital signs within normal limits  Outcome: PROGRESSING AS EXPECTED  VSS, normal lochia, fundus firm.    Problem: Alteration in comfort related to surgical incision and/or after birth pains  Goal: Patient is able to ambulate, care for self and infant with acceptable pain level  Outcome: PROGRESSING AS EXPECTED  Patient is able to ambulate and care for infant with PRN pain control medications.

## 2018-06-07 NOTE — PROGRESS NOTES
Progress Note    Subjective:   Doing well. No issues or concerns. BF well.    Objective Data:  Recent Labs      06/05/18   1117  06/06/18   0012  06/07/18   0357   WBC  12.0*  9.5  9.7   RBC  3.41*  3.10*  3.02*   HEMOGLOBIN  9.1*  8.1*  8.1*   HEMATOCRIT  27.1*  25.1*  24.6*   MCV  79.5*  81.0*  81.5   MCH  26.7*  26.1*  26.8*   MCHC  33.6  32.3*  32.9*   RDW  39.6  40.4  41.5   PLATELETCT  130*  143*  153*   MPV  10.4  10.1  9.9           Vitals:    06/06/18 0400 06/06/18 0800 06/06/18 2000 06/07/18 0900   BP: 115/69 125/72 132/83 129/80   Pulse: 85 89 85 92   Resp: 19 18 18 18   Temp: 36.8 °C (98.2 °F) 36.8 °C (98.2 °F) 36.4 °C (97.6 °F) 36.7 °C (98.1 °F)   SpO2: 95% 95% 96% 96%   Weight:       Height:         Abdomen: soft non tender abdomen with min ttp at c/d/i incision without any erythema or induration  Perineum: no sig bleeding or discharge this am  Ext:non tender calves    No intake or output data in the 24 hours ending 06/07/18 1053    Current Facility-Administered Medications   Medication Dose Route Frequency Provider Last Rate Last Dose   • ibuprofen (MOTRIN) tablet 600 mg  600 mg Oral Q6HRS PRJUAN RAMON Adame M.D.   600 mg at 06/07/18 0827   • acetaminophen (TYLENOL) tablet 325 mg  325 mg Oral Q4HRS PRJUAN RAMON Adame M.D.       • oxyCODONE-acetaminophen (PERCOCET) 5-325 MG per tablet 1 Tab  1 Tab Oral Q4HRS HELEN Adame M.D.   1 Tab at 06/06/18 0627   • oxyCODONE-acetaminophen (PERCOCET-10)  MG per tablet 1 Tab  1 Tab Oral Q4HRS HELEN Adame M.D.   1 Tab at 06/07/18 0827   • morphine (pf) 4 mg/ml injection 4 mg  4 mg Intramuscular Q3HRS PRN Clemente Adame M.D.       • ondansetron (ZOFRAN) syringe/vial injection 4 mg  4 mg Intravenous Q6HRS PRN Clemente Adame M.D.        Or   • ondansetron (ZOFRAN ODT) dispertab 4 mg  4 mg Oral Q6HRS PRN Clemente Adame M.D.       • oxytocin (PITOCIN) infusion (for postpartum)   mL/hr Intravenous Continuous Clemente Adame M.D.    Stopped at 06/05/18 1400   • LR infusion   Intravenous PRN Clemente Adame M.D.   Stopped at 06/05/18 1234   • PRN oxytocin (PITOCIN) (20 Units/1000 mL) PRN for excessive uterine bleeding - See Admin Instr  125-999 mL/hr Intravenous Once PRN Clemente Adame M.D.       • docusate sodium (COLACE) capsule 100 mg  100 mg Oral BID PRN Clemente Adame M.D.   100 mg at 06/06/18 2343   • simethicone (MYLICON) chewable tab 80 mg  80 mg Oral 4X/DAY PRN Clemente Adame M.D.   80 mg at 06/06/18 2343       A/P S/P C/S now POD #2. Doing well. Plan to proceed with the usual pp and post operative management and will discharge home tomorrow.

## 2018-06-07 NOTE — PROGRESS NOTES
0705- Report received from LAUREN Guerra.  Patient sleeping.  0935- Patient assessment done.  Patient stated that she is voiding without difficulty and passing flatus.  Patient denied dizziness and stated that she is walking without difficulty.  Discussed pain management plan and patient prefers to be offered pain medication as it become available.  Reviewed plan of care.  Patient instructed to ambulate in hallways.  Patient instructed to use incentive spirometer hourly.  Patient verbalized understanding.  FOB at bedside.

## 2018-06-08 VITALS
BODY MASS INDEX: 29.44 KG/M2 | SYSTOLIC BLOOD PRESSURE: 123 MMHG | DIASTOLIC BLOOD PRESSURE: 83 MMHG | HEIGHT: 62 IN | HEART RATE: 74 BPM | OXYGEN SATURATION: 94 % | RESPIRATION RATE: 16 BRPM | WEIGHT: 160 LBS | TEMPERATURE: 97.5 F

## 2018-06-08 PROCEDURE — A9270 NON-COVERED ITEM OR SERVICE: HCPCS | Performed by: SPECIALIST

## 2018-06-08 PROCEDURE — 700112 HCHG RX REV CODE 229: Performed by: SPECIALIST

## 2018-06-08 PROCEDURE — 700102 HCHG RX REV CODE 250 W/ 637 OVERRIDE(OP): Performed by: SPECIALIST

## 2018-06-08 RX ORDER — IBUPROFEN 600 MG/1
600 TABLET ORAL EVERY 6 HOURS PRN
Qty: 30 TAB | Refills: 0 | Status: SHIPPED | OUTPATIENT
Start: 2018-06-08 | End: 2021-07-19

## 2018-06-08 RX ORDER — OXYCODONE HYDROCHLORIDE AND ACETAMINOPHEN 5; 325 MG/1; MG/1
1-2 TABLET ORAL EVERY 4 HOURS PRN
Qty: 30 TAB | Refills: 0 | Status: SHIPPED | OUTPATIENT
Start: 2018-06-08 | End: 2018-06-15

## 2018-06-08 RX ADMIN — OXYCODONE HYDROCHLORIDE AND ACETAMINOPHEN 1 TABLET: 10; 325 TABLET ORAL at 09:34

## 2018-06-08 RX ADMIN — OXYCODONE HYDROCHLORIDE AND ACETAMINOPHEN 1 TABLET: 10; 325 TABLET ORAL at 13:38

## 2018-06-08 RX ADMIN — OXYCODONE HYDROCHLORIDE AND ACETAMINOPHEN 1 TABLET: 10; 325 TABLET ORAL at 01:15

## 2018-06-08 RX ADMIN — OXYCODONE HYDROCHLORIDE AND ACETAMINOPHEN 1 TABLET: 10; 325 TABLET ORAL at 05:17

## 2018-06-08 RX ADMIN — DOCUSATE SODIUM 100 MG: 100 CAPSULE ORAL at 09:34

## 2018-06-08 RX ADMIN — IBUPROFEN 600 MG: 600 TABLET, FILM COATED ORAL at 09:34

## 2018-06-08 RX ADMIN — SIMETHICONE CHEW TAB 80 MG 80 MG: 80 TABLET ORAL at 05:19

## 2018-06-08 RX ADMIN — IBUPROFEN 600 MG: 600 TABLET, FILM COATED ORAL at 15:51

## 2018-06-08 RX ADMIN — IBUPROFEN 600 MG: 600 TABLET, FILM COATED ORAL at 03:15

## 2018-06-08 RX ADMIN — IBUPROFEN 600 MG: 600 TABLET, FILM COATED ORAL at 15:54

## 2018-06-08 RX ADMIN — SIMETHICONE CHEW TAB 80 MG 80 MG: 80 TABLET ORAL at 11:21

## 2018-06-08 ASSESSMENT — PAIN SCALES - GENERAL
PAINLEVEL_OUTOF10: 7
PAINLEVEL_OUTOF10: 5
PAINLEVEL_OUTOF10: 7
PAINLEVEL_OUTOF10: 2
PAINLEVEL_OUTOF10: 7
PAINLEVEL_OUTOF10: 4
PAINLEVEL_OUTOF10: 5

## 2018-06-08 NOTE — DISCHARGE SUMMARY
DATE OF ADMISSION:  2018    DATE OF DISCHARGE:  2018    DISCHARGE DIAGNOSES:  1.  Status post a repeat low transverse  section for spontaneous   rupture of membranes in active labor.  2.  Uncomplicated postpartum course.    HISTORY OF PRESENT ILLNESS:  This is a 28-year-old  2, para 1, at   39-4/7 weeks gestation, who presented to labor and delivery with complaints of   leakage of fluid.  Once spontaneous rupture of membranes was confirmed, the   patient was interested in proceeding forward with a repeat low transverse    section and was subsequently admitted.    PAST MEDICAL HISTORY AND PHYSICAL EXAMINATION:  Can be found in dictated   history and physical.    ASSESSMENT AND PLAN:  A 28-year-old  2, para 1, at term with overall   reassuring fetal status with spontaneous rupture of membranes in early labor   with plan to proceed forward with repeat low transverse  section.    HOSPITAL COURSE:  As stated above, patient did undergo repeat low transverse    section with Apgars of 8 and 9 at one and five minutes respectively   with a  weight of 3755 g.  Estimated blood loss for the delivery was   700 mL.  Postoperatively, the patient did have some intermittent bleeding.    She did have serial hemoglobin and hematocrits followed, which were stable.    The bleeding did subside to normal postoperative bleeding and on postoperative   day #3, she had met all discharge criteria, she was ambulating and voiding   well, tolerating regular diet, her pain was well controlled, her incision was   clean, dry, and intact without any erythema or induration, pain was well   controlled with Motrin and Percocet, for which she will be discharged home on.    DISCHARGE FOLLOWUP:  In 2 and 6 weeks.    DISCHARGE INSTRUCTIONS:  She is to call if any increased temperature greater   than 100.4, increasing vaginal bleeding, abdominal pain unrelieved with any   p.o. pain medication or  to call with any other questions or concerns.       ____________________________________     MD MINISTERIO HUI / KYLE    DD:  06/08/2018 07:56:45  DT:  06/08/2018 08:20:32    D#:  [id number]  Job#:  [id number]

## 2018-06-08 NOTE — CARE PLAN
Problem: Altered physiologic condition related to postoperative  delivery  Goal: Patient physiologically stable as evidenced by normal lochia, palpable uterine involution and vital signs within normal limits  Outcome: PROGRESSING AS EXPECTED  Fundus firm, normal lochia, patient educated on signs/symptoms of increased bleeding.    Problem: Potential for postpartum infection related to surgical incision, compromised uterine condition, urinary tract or respiratory compromise  Goal: Patient will be afebrile and free from signs and symptoms of infection  Outcome: PROGRESSING AS EXPECTED  Patient afebrile. No signs or symptoms of infection.

## 2018-06-08 NOTE — PROGRESS NOTES
Progress Note    Subjective:   Doing well. No issues or concerns. Pain well controlled. BF well. No sig bleeding or discharge    Objective Data:  Recent Labs      06/05/18   1117  06/06/18   0012  06/07/18   0357   WBC  12.0*  9.5  9.7   RBC  3.41*  3.10*  3.02*   HEMOGLOBIN  9.1*  8.1*  8.1*   HEMATOCRIT  27.1*  25.1*  24.6*   MCV  79.5*  81.0*  81.5   MCH  26.7*  26.1*  26.8*   MCHC  33.6  32.3*  32.9*   RDW  39.6  40.4  41.5   PLATELETCT  130*  143*  153*   MPV  10.4  10.1  9.9           Vitals:    06/06/18 0800 06/06/18 2000 06/07/18 0900 06/07/18 2000   BP: 125/72 132/83 129/80 134/91   Pulse: 89 85 92 96   Resp: 18 18 18 16   Temp: 36.8 °C (98.2 °F) 36.4 °C (97.6 °F) 36.7 °C (98.1 °F) 36.6 °C (97.8 °F)   SpO2: 95% 96% 96% 94%   Weight:       Height:         ABdomen: soft non tender fundus at umbilicus  Perineum: no sig bleeding or discharge  Ext:non tender calves    No intake or output data in the 24 hours ending 06/08/18 0750    Current Facility-Administered Medications   Medication Dose Route Frequency Provider Last Rate Last Dose   • ibuprofen (MOTRIN) tablet 600 mg  600 mg Oral Q6HRS PRJUAN RAMON Adame M.D.   600 mg at 06/08/18 0315   • acetaminophen (TYLENOL) tablet 325 mg  325 mg Oral Q4HRS HELEN Adame M.D.       • oxyCODONE-acetaminophen (PERCOCET) 5-325 MG per tablet 1 Tab  1 Tab Oral Q4HRS HELEN Adame M.D.   1 Tab at 06/07/18 1229   • oxyCODONE-acetaminophen (PERCOCET-10)  MG per tablet 1 Tab  1 Tab Oral Q4HRS HELEN Adame M.D.   1 Tab at 06/08/18 0517   • morphine (pf) 4 mg/ml injection 4 mg  4 mg Intramuscular Q3HRS PRN Clemente Adame M.D.       • ondansetron (ZOFRAN) syringe/vial injection 4 mg  4 mg Intravenous Q6HRS PRN Clemente Adame M.D.        Or   • ondansetron (ZOFRAN ODT) dispertab 4 mg  4 mg Oral Q6HRS PRN Clemente Adame M.D.       • oxytocin (PITOCIN) infusion (for postpartum)   mL/hr Intravenous Continuous Clemente Adame M.D.   Stopped at  06/05/18 1400   • LR infusion   Intravenous PRN Clemente Adame M.D.   Stopped at 06/05/18 1234   • PRN oxytocin (PITOCIN) (20 Units/1000 mL) PRN for excessive uterine bleeding - See Admin Instr  125-999 mL/hr Intravenous Once PRN Clemente Adame M.D.       • docusate sodium (COLACE) capsule 100 mg  100 mg Oral BID PRN Clemente Adame M.D.   100 mg at 06/07/18 2105   • simethicone (MYLICON) chewable tab 80 mg  80 mg Oral 4X/DAY PRN Clemente Adame M.D.   80 mg at 06/08/18 0519       A/P S/P C/S now POD #3. Plan to discharge home today with f/u in 2 weeks. Discharge instructions given.

## 2018-06-08 NOTE — DISCHARGE INSTRUCTIONS
POSTPARTUM DISCHARGE INSTRUCTIONS FOR MOM    YOB: 1990   Age: 28 y.o.               Admit Date: 2018     Discharge Date: 2018  Attending Doctor:  Yonatan Sanders M.D.                  Allergies:  Nkda [no known drug allergy]    Discharged to home by car. Discharged via wheelchair, hospital escort: Yes.  Special equipment needed: Not Applicable  Belongings with: Personal  Be sure to schedule a follow-up appointment with your primary care doctor or any specialists as instructed.     Discharge Plan:   Diet Plan: Discussed  Activity Level: Discussed  Confirmed Follow up Appointment: Patient to Call and Schedule Appointment  Confirmed Symptoms Management: Discussed  Medication Reconciliation Updated: Yes  Influenza Vaccine Indication: Not indicated: Previously immunized this influenza season and > 8 years of age    REASONS TO CALL YOUR OBSTETRICIAN:  1.   Persistent fever or shaking chills (Temperature higher than 100.4)  2.   Heavy bleeding (soaking more than 1 pad per hour); Passing clots  3.   Foul odor from vagina  4.   Mastitis (Breast infection; breast pain, chills, fever, redness)  5.   Urinary pain, burning or frequency  6.   Episiotomy infection  7.   Abdominal incision infection  8.   Severe depression longer than 24 hours    HAND WASHING  · Prior to handling the baby.  · Before breastfeeding or bottle feeding baby.  · After using the bathroom or changing the baby's diaper.    WOUND CARE  Ask your physician for additional care instructions.  In general:    ·  Incision:      · Keep clean and dry.    · Do NOT lift anything heavier than your baby for up to 6 weeks.    · There should not be any opening or pus.      VAGINAL CARE  · Nothing inside vagina for 6 weeks: no sexual intercourse, tampons or douching.  · Bleeding may continue for 2-4 weeks.  Amount may vary.    · Call your physician for heavy bleeding which means soaking more than 1 pad per hour    BIRTH CONTROL  · It is possible  "to become pregnant at any time after delivery and while breastfeeding.  · Plan to discuss a method of birth control with your physician at your follow up visit. visit.    DIET AND ELIMINATION  · Eating more fiber (bran cereal, fruits, and vegetables) and drinking plenty of fluids will help to avoid constipation.  · Urinary frequency after childbirth is normal.    POSTPARTUM BLUES  During the first few days after birth, you may experience a sense of the \"blues\" which may include impatience, irritability or even crying.  These feeling come and go quickly.  However, as many as 1 in 10 women experience emotional symptoms known as postpartum depression.    Postpartum depression:  May start as early as the second or third day after delivery or take several weeks or months to develop.  Symptoms of \"blues\" are present, but are more intense:  Crying spells; loss of appetite; feelings of hopelessness or loss of control; fear of touching the baby; over concern or no concern at all about the baby; little or no concern about your own appearance/caring for yourself; and/or inability to sleep or excessive sleeping.  Contact your physician if you are experiencing any of these symptoms.    Crisis Hotline:  · [place] Crisis Hotline:  6-605-KHJRTTC  Or 1-429.229.8472  · [place] Crisis Hotline:  1-548.492.2774  Or 169-643-3960    PREVENTING SHAKEN BABY:  If you are angry or stressed, PUT THE BABY IN THE CRIB, step away, take some deep breaths, and wait until you are calm to care for the baby.  DO NOT SHAKE THE BABY.  You are not alone, call a supporter for help.    · Crisis [facility] Center 24/7 crisis line 665-030-5667 or 1-874.300.8762  · You can also text them, text \"ANSWER\" to 603706    QUIT SMOKING/TOBACCO USE:  I understand the use of any tobacco products increases my chance of suffering from future heart disease and could cause other illnesses which may shorten my life. Quitting the use of tobacco products is the single most " important thing I can do to improve my health. For further information on smoking / tobacco cessation call a Toll Free Quit Line at 1-363.873.7527 (*[employer] [place]) or 1-588.470.4469 (American Lung Association) or you can access the web based program at www.lungusa.org.    · [place] Tobacco Users Help Line:  (214) 571-5305       Toll Free: 1-100.317.1680  · Quit Tobacco Program [facility] [place] [facility] (255)976-9220    DEPRESSION / SUICIDE RISK:  As you are discharged from this [facility], it is important to learn how to keep safe from harming yourself.    Recognize the warning signs:  · Abrupt changes in personality, positive or negative- including increase in energy   · Giving away possessions  · Change in eating patterns- significant weight changes-  positive or negative  · Change in sleeping patterns- unable to sleep or sleeping all the time   · Unwillingness or inability to communicate  · Depression  · Unusual sadness, discouragement and loneliness  · Talk of wanting to die  · Neglect of personal appearance   · Rebelliousness- reckless behavior  · Withdrawal from people/activities they love  · Confusion- inability to concentrate     If you or a loved one observes any of these behaviors or has concerns about self-harm, here's what you can do:  · Talk about it- your feelings and reasons for harming yourself  · Remove any means that you might use to hurt yourself (examples: pills, rope, extension cords, firearm)  · Get professional help from the community (Mental Health, Substance Abuse, psychological counseling)  · Do not be alone:Call your Safe Contact- someone whom you trust who will be there for you.  · Call your local CRISIS HOTLINE 635-3786 or 978-974-8087  · Call your local Children's Mobile Crisis Response Team [place] (999) 774-6276 or www.[employer]  · Call the toll free National Suicide Prevention Hotlines   · National Suicide Prevention Lifeline 837-141-BISJ  (3216)  · [facility] 800-SUICIDE (885-6599)    DISCHARGE SURVEY:  Thank you for choosing [facility].  We hope we provided you with very good care.  You may be receiving a survey in the mail.  Please fill it out.  Your opinion is valuable to us.    ADDITIONAL EDUCATIONAL MATERIALS GIVEN TO PATIENT:        My signature on this form indicates that:  1.  I have reviewed and understand the above information  2.  My questions regarding this information have been answered to my satisfaction.  3.  I have formulated a plan with my discharge nurse to obtain my prescribed medication for home.

## 2018-06-08 NOTE — PROGRESS NOTES
Lactation Note:    Met with MOB for a follow up lactation visit.  Assistance offered with breastfeeding, but MOB declined.  Stated infant is latching without difficulty and feeding well.  MOB reported that her milk just came in today.  Breast assessment performed and breasts found full with no redness or tissue breakdown noted.  MOB denied pain with latch.  MOB stated she would be pumping to remove milk and save for next feeding.      Breastfeeding plan is for MOB to continue to offer breast first to infant when feeding cues are observed or within 3 hours from the last feed if infant is asleep.  MOB is then going to supplement with formula and/or EBM according to [employer] supplementation guidelines.  MOB has a handout of these guidelines and stated she is aware of how to follow it.  MOB will be supplementing following each feed at the breast as directed by pediatrician due to infant's weight loss which is greater than 10%.  In the event that infant has sub-optimal or no latch, MOB will also pump to protect milk supply.    MOB informed she can  a [facility] hospital grade breast pump from the [employer].  Provided [facility] pump paperwork.  MOB is seen at the [facility] office in [place], [place].  MOB aware of the outpatient lactation assistance available to her through [facility] and the [employer].  Invited MOB to attend Breastfeeding Forum.    MOB verbalized understanding of all information provided to her and denied having any further questions at this time.  Encouraged to call for assistance as needed.

## 2018-06-09 NOTE — PROGRESS NOTES
Discharged to home with infant-pt given written discharge instructions and prescriptions-all questions answered-escorted out in wheelchair by staff.

## 2018-07-24 ENCOUNTER — HOSPITAL ENCOUNTER (OUTPATIENT)
Dept: LAB | Facility: MEDICAL CENTER | Age: 28
End: 2018-07-24
Attending: SPECIALIST
Payer: MEDICAID

## 2018-07-24 PROCEDURE — 88175 CYTOPATH C/V AUTO FLUID REDO: CPT

## 2018-07-24 PROCEDURE — 87624 HPV HI-RISK TYP POOLED RSLT: CPT

## 2018-07-27 LAB
CYTOLOGY REG CYTOL: NORMAL
HPV HR 12 DNA CVX QL NAA+PROBE: NEGATIVE
HPV16 DNA SPEC QL NAA+PROBE: NEGATIVE
HPV18 DNA SPEC QL NAA+PROBE: NEGATIVE
SPECIMEN SOURCE: NORMAL

## 2018-10-01 ENCOUNTER — HOSPITAL ENCOUNTER (EMERGENCY)
Facility: MEDICAL CENTER | Age: 28
End: 2018-10-01
Attending: EMERGENCY MEDICINE
Payer: MEDICAID

## 2018-10-01 ENCOUNTER — APPOINTMENT (OUTPATIENT)
Dept: RADIOLOGY | Facility: MEDICAL CENTER | Age: 28
End: 2018-10-01
Attending: EMERGENCY MEDICINE
Payer: MEDICAID

## 2018-10-01 VITALS
DIASTOLIC BLOOD PRESSURE: 68 MMHG | BODY MASS INDEX: 25.92 KG/M2 | HEIGHT: 62 IN | TEMPERATURE: 98 F | RESPIRATION RATE: 16 BRPM | OXYGEN SATURATION: 97 % | SYSTOLIC BLOOD PRESSURE: 133 MMHG | WEIGHT: 140.87 LBS | HEART RATE: 82 BPM

## 2018-10-01 DIAGNOSIS — R10.11 RIGHT UPPER QUADRANT ABDOMINAL PAIN: ICD-10-CM

## 2018-10-01 DIAGNOSIS — R07.9 CHEST PAIN, UNSPECIFIED TYPE: ICD-10-CM

## 2018-10-01 DIAGNOSIS — N39.0 URINARY TRACT INFECTION WITHOUT HEMATURIA, SITE UNSPECIFIED: ICD-10-CM

## 2018-10-01 LAB
ALBUMIN SERPL BCP-MCNC: 4.8 G/DL (ref 3.2–4.9)
ALBUMIN/GLOB SERPL: 1.5 G/DL
ALP SERPL-CCNC: 57 U/L (ref 30–99)
ALT SERPL-CCNC: 29 U/L (ref 2–50)
ANION GAP SERPL CALC-SCNC: 10 MMOL/L (ref 0–11.9)
APPEARANCE UR: ABNORMAL
APTT PPP: 27.3 SEC (ref 24.7–36)
AST SERPL-CCNC: 24 U/L (ref 12–45)
BACTERIA #/AREA URNS HPF: ABNORMAL /HPF
BASOPHILS # BLD AUTO: 0.5 % (ref 0–1.8)
BASOPHILS # BLD: 0.03 K/UL (ref 0–0.12)
BILIRUB SERPL-MCNC: 0.2 MG/DL (ref 0.1–1.5)
BILIRUB UR QL STRIP.AUTO: NEGATIVE
BUN SERPL-MCNC: 16 MG/DL (ref 8–22)
CALCIUM SERPL-MCNC: 9.4 MG/DL (ref 8.4–10.2)
CHLORIDE SERPL-SCNC: 104 MMOL/L (ref 96–112)
CO2 SERPL-SCNC: 24 MMOL/L (ref 20–33)
COLOR UR: YELLOW
CREAT SERPL-MCNC: 0.61 MG/DL (ref 0.5–1.4)
D DIMER PPP IA.FEU-MCNC: 0.45 UG/ML (FEU) (ref 0–0.5)
EKG IMPRESSION: NORMAL
EOSINOPHIL # BLD AUTO: 0.07 K/UL (ref 0–0.51)
EOSINOPHIL NFR BLD: 1.2 % (ref 0–6.9)
EPI CELLS #/AREA URNS HPF: ABNORMAL /HPF
ERYTHROCYTE [DISTWIDTH] IN BLOOD BY AUTOMATED COUNT: 43.3 FL (ref 35.9–50)
GLOBULIN SER CALC-MCNC: 3.3 G/DL (ref 1.9–3.5)
GLUCOSE SERPL-MCNC: 98 MG/DL (ref 65–99)
GLUCOSE UR STRIP.AUTO-MCNC: NEGATIVE MG/DL
HCG SERPL QL: NEGATIVE
HCT VFR BLD AUTO: 41.1 % (ref 37–47)
HGB BLD-MCNC: 14.1 G/DL (ref 12–16)
IMM GRANULOCYTES # BLD AUTO: 0.01 K/UL (ref 0–0.11)
IMM GRANULOCYTES NFR BLD AUTO: 0.2 % (ref 0–0.9)
INR PPP: 1.01 (ref 0.87–1.13)
KETONES UR STRIP.AUTO-MCNC: NEGATIVE MG/DL
LEUKOCYTE ESTERASE UR QL STRIP.AUTO: ABNORMAL
LIPASE SERPL-CCNC: 32 U/L (ref 7–58)
LYMPHOCYTES # BLD AUTO: 2.21 K/UL (ref 1–4.8)
LYMPHOCYTES NFR BLD: 38.4 % (ref 22–41)
MCH RBC QN AUTO: 28 PG (ref 27–33)
MCHC RBC AUTO-ENTMCNC: 34.3 G/DL (ref 33.6–35)
MCV RBC AUTO: 81.7 FL (ref 81.4–97.8)
MICRO URNS: ABNORMAL
MONOCYTES # BLD AUTO: 0.63 K/UL (ref 0–0.85)
MONOCYTES NFR BLD AUTO: 10.9 % (ref 0–13.4)
MUCOUS THREADS #/AREA URNS HPF: ABNORMAL /HPF
NEUTROPHILS # BLD AUTO: 2.81 K/UL (ref 2–7.15)
NEUTROPHILS NFR BLD: 48.8 % (ref 44–72)
NITRITE UR QL STRIP.AUTO: POSITIVE
NRBC # BLD AUTO: 0 K/UL
NRBC BLD-RTO: 0 /100 WBC
PH UR STRIP.AUTO: 6 [PH]
PLATELET # BLD AUTO: 237 K/UL (ref 164–446)
PMV BLD AUTO: 9.4 FL (ref 9–12.9)
POTASSIUM SERPL-SCNC: 3.6 MMOL/L (ref 3.6–5.5)
PROT SERPL-MCNC: 8.1 G/DL (ref 6–8.2)
PROT UR QL STRIP: NEGATIVE MG/DL
PROTHROMBIN TIME: 13.2 SEC (ref 12–14.6)
RBC # BLD AUTO: 5.03 M/UL (ref 4.2–5.4)
RBC # URNS HPF: ABNORMAL /HPF
RBC UR QL AUTO: NEGATIVE
SODIUM SERPL-SCNC: 138 MMOL/L (ref 135–145)
SP GR UR STRIP.AUTO: 1.02
WBC # BLD AUTO: 5.8 K/UL (ref 4.8–10.8)
WBC #/AREA URNS HPF: ABNORMAL /HPF

## 2018-10-01 PROCEDURE — 85025 COMPLETE CBC W/AUTO DIFF WBC: CPT

## 2018-10-01 PROCEDURE — 84703 CHORIONIC GONADOTROPIN ASSAY: CPT

## 2018-10-01 PROCEDURE — 36415 COLL VENOUS BLD VENIPUNCTURE: CPT

## 2018-10-01 PROCEDURE — 81001 URINALYSIS AUTO W/SCOPE: CPT

## 2018-10-01 PROCEDURE — 99284 EMERGENCY DEPT VISIT MOD MDM: CPT

## 2018-10-01 PROCEDURE — 71045 X-RAY EXAM CHEST 1 VIEW: CPT

## 2018-10-01 PROCEDURE — 93005 ELECTROCARDIOGRAM TRACING: CPT | Performed by: EMERGENCY MEDICINE

## 2018-10-01 PROCEDURE — 85610 PROTHROMBIN TIME: CPT

## 2018-10-01 PROCEDURE — 80053 COMPREHEN METABOLIC PANEL: CPT

## 2018-10-01 PROCEDURE — 83690 ASSAY OF LIPASE: CPT

## 2018-10-01 PROCEDURE — 93005 ELECTROCARDIOGRAM TRACING: CPT

## 2018-10-01 PROCEDURE — 94760 N-INVAS EAR/PLS OXIMETRY 1: CPT

## 2018-10-01 PROCEDURE — 85379 FIBRIN DEGRADATION QUANT: CPT

## 2018-10-01 PROCEDURE — 85730 THROMBOPLASTIN TIME PARTIAL: CPT

## 2018-10-01 RX ORDER — SULFAMETHOXAZOLE AND TRIMETHOPRIM 800; 160 MG/1; MG/1
1 TABLET ORAL 2 TIMES DAILY
Qty: 14 TAB | Refills: 0 | Status: SHIPPED | OUTPATIENT
Start: 2018-10-01 | End: 2018-10-08

## 2018-10-01 ASSESSMENT — PAIN SCALES - GENERAL: PAINLEVEL_OUTOF10: 5

## 2018-10-01 ASSESSMENT — PAIN DESCRIPTION - DESCRIPTORS
DESCRIPTORS: CRAMPING
DESCRIPTORS: STABBING

## 2018-10-02 NOTE — ED PROVIDER NOTES
"CHIEF COMPLAINT  Abdominal Pain    HPI  Rachel Green is a 28 y.o. female who presents to the emergency department for the evaluation of abdominal pain. The patient states that she had the sudden onset of RUQ pain as she was picking her son up from the ground. She states that the pain radiated to the right chest and shoulder. She states that movement makes the pain worse and lying still alleviates the pain. She states that she felt short of breath at that time but denies current SOB. She denies any nausea or vomiting. She denies any diaphoresis or syncope. She denies any recent cough or fever. She denies any recent travel, calf swelling, history of blood clots, hemoptysis, or history of cancer. She is on the \"mini pill\" and had a  4 months ago.    REVIEW OF SYSTEMS  See HPI for further details. All other systems are negative.     PAST MEDICAL HISTORY   has a past medical history of Blood transfusion without reported diagnosis.    SOCIAL HISTORY  Social History     Social History Main Topics   • Smoking status: Former Smoker     Years: 3.00     Types: Cigarettes     Quit date: 2018   • Smokeless tobacco: Never Used      Comment: Quit on 2017   • Alcohol use Yes   • Drug use: Yes     Types: Marijuana      Comment: last used marijuana 2017   • Sexual activity: Yes     Partners: Male      Comment: Planned pregnancy.        SURGICAL HISTORY   has a past surgical history that includes tonsillectomy (08); gyn surgery; other; primary c section (2007); and repeat c section (Bilateral, 2018).    CURRENT MEDICATIONS  Home Medications    **Home medications have not yet been reviewed for this encounter**         ALLERGIES  Allergies   Allergen Reactions   • Nkda [No Known Drug Allergy]        PHYSICAL EXAM  VITAL SIGNS: /68   Pulse 76   Temp 36.7 °C (98 °F)   Resp 16   Ht 1.575 m (5' 2\")   Wt 63.9 kg (140 lb 14 oz)   LMP 2018 (Approximate)   SpO2 96%   Breastfeeding? " No   BMI 25.77 kg/m²    Constitutional: Alert and in no apparent distress.  HENT: Normocephalic atraumatic. Bilateral external ears normal. Nose normal. Mucous membranes are moist.  Eyes: Pupils are equal and reactive. Conjunctiva normal. Non-icteric sclera.   Neck: Normal range of motion without tenderness. Supple. No meningeal signs.  Cardiovascular: Regular rate and rhythm. No murmurs, gallops or rubs.  Thorax & Lungs: Breath sounds are clear to auscultation bilaterally. No wheezing, rhonchi or rales.  Abdomen: Soft, nontender and nondistended. No peritoneal signs noted.  Skin: Warm and dry. No rashes are noted.  Back: No bony tenderness, No CVA tenderness.   Extremities: 2+ peripheral pulses. Cap refill is less than 2 seconds. No edema, cyanosis, or clubbing.  Musculoskeletal: Good range of motion in all major joints. No tenderness to palpation or major deformities noted.   Neurologic: Alert and oriented ×3. The patient moves all 4 extremities and follows commands.  Psychiatric: Affect is normal. Judgment appears to be intact.      DIAGNOSTIC STUDIES / PROCEDURES    EKG  EKG was performed at 18:07. It shows a normal sinus rhythm with a heart rate of 69. VA interval is 152. QT and QTC are 352/377. Axis is normal. No acute ST elevation or depression is noted. No previous EKGs available for comparison. Impression: Normal EKG.    LABS  Results for orders placed or performed during the hospital encounter of 10/01/18   CBC WITH DIFFERENTIAL   Result Value Ref Range    WBC 5.8 4.8 - 10.8 K/uL    RBC 5.03 4.20 - 5.40 M/uL    Hemoglobin 14.1 12.0 - 16.0 g/dL    Hematocrit 41.1 37.0 - 47.0 %    MCV 81.7 81.4 - 97.8 fL    MCH 28.0 27.0 - 33.0 pg    MCHC 34.3 33.6 - 35.0 g/dL    RDW 43.3 35.9 - 50.0 fL    Platelet Count 237 164 - 446 K/uL    MPV 9.4 9.0 - 12.9 fL    Neutrophils-Polys 48.80 44.00 - 72.00 %    Lymphocytes 38.40 22.00 - 41.00 %    Monocytes 10.90 0.00 - 13.40 %    Eosinophils 1.20 0.00 - 6.90 %    Basophils  0.50 0.00 - 1.80 %    Immature Granulocytes 0.20 0.00 - 0.90 %    Nucleated RBC 0.00 /100 WBC    Neutrophils (Absolute) 2.81 2.00 - 7.15 K/uL    Lymphs (Absolute) 2.21 1.00 - 4.80 K/uL    Monos (Absolute) 0.63 0.00 - 0.85 K/uL    Eos (Absolute) 0.07 0.00 - 0.51 K/uL    Baso (Absolute) 0.03 0.00 - 0.12 K/uL    Immature Granulocytes (abs) 0.01 0.00 - 0.11 K/uL    NRBC (Absolute) 0.00 K/uL   COMP METABOLIC PANEL   Result Value Ref Range    Sodium 138 135 - 145 mmol/L    Potassium 3.6 3.6 - 5.5 mmol/L    Chloride 104 96 - 112 mmol/L    Co2 24 20 - 33 mmol/L    Anion Gap 10.0 0.0 - 11.9    Glucose 98 65 - 99 mg/dL    Bun 16 8 - 22 mg/dL    Creatinine 0.61 0.50 - 1.40 mg/dL    Calcium 9.4 8.4 - 10.2 mg/dL    AST(SGOT) 24 12 - 45 U/L    ALT(SGPT) 29 2 - 50 U/L    Alkaline Phosphatase 57 30 - 99 U/L    Total Bilirubin 0.2 0.1 - 1.5 mg/dL    Albumin 4.8 3.2 - 4.9 g/dL    Total Protein 8.1 6.0 - 8.2 g/dL    Globulin 3.3 1.9 - 3.5 g/dL    A-G Ratio 1.5 g/dL   LIPASE   Result Value Ref Range    Lipase 32 7 - 58 U/L   URINALYSIS CULTURE, IF INDICATED   Result Value Ref Range    Color Yellow     Character Sl Cloudy (A)     Specific Gravity 1.020 <1.035    Ph 6.0 5.0 - 8.0    Glucose Negative Negative mg/dL    Ketones Negative Negative mg/dL    Protein Negative Negative mg/dL    Bilirubin Negative Negative    Nitrite Positive (A) Negative    Leukocyte Esterase Trace (A) Negative    Occult Blood Negative Negative    Micro Urine Req Microscopic    HCG QUAL SERUM   Result Value Ref Range    Beta-Hcg Qualitative Serum Negative Negative   D-DIMER   Result Value Ref Range    D-Dimer Screen 0.45 0.00 - 0.50 ug/mL (FEU)   PROTHROMBIN TIME (INR)   Result Value Ref Range    PT 13.2 12.0 - 14.6 sec    INR 1.01 0.87 - 1.13   APTT   Result Value Ref Range    APTT 27.3 24.7 - 36.0 sec   URINE MICROSCOPIC (W/UA)   Result Value Ref Range    WBC 5-10 (A) /hpf    RBC 0-2 /hpf    Bacteria Moderate (A) None /hpf    Epithelial Cells Few Few /hpf     Mucous Threads Few /hpf   ESTIMATED GFR   Result Value Ref Range    GFR If African American >60 >60 mL/min/1.73 m 2    GFR If Non African American >60 >60 mL/min/1.73 m 2   EKG   Result Value Ref Range    Report       [facility] Emergency Dept.    Test Date:  2018-10-01  Pt Name:    ELEAZAR CHI                 Department: [facility]  MRN:        [id number]                      Room:  Gender:     Female                       Technician: HARJINDER  :        1990                   Requested By:ER TRIAGE PROTOCOL  Order #:    [id number]                    Reading MD:    Measurements  Intervals                                Axis  Rate:       69                           P:          33  WA:         152                          QRS:        65  QRSD:       86                           T:          29  QT:         352  QTc:        377    Interpretive Statements  SINUS RHYTHM  No previous ECG available for comparison       RADIOLOGY  DX-CHEST-PORTABLE (1 VIEW)   Final Result         1.  No acute cardiopulmonary disease.        COURSE & MEDICAL DECISION MAKING  Pertinent Labs & Imaging studies reviewed. (See chart for details)    Eleazar Chi is a 28 y.o. female who presents to the emergency department for the evaluation of abdominal pain. On initial evaluation, the patient appeared well and in no acute distress. Her abdominal exam was actually quite reassuring with no tenderness to palpation in the right upper quadrant or epigastrium. She also did not have any tenderness palpation over the chest wall. EKG was performed in triage and did not reveal any evidence of ischemia or arrhythmia. A chest x-ray was also performed here in emergency department and did not reveal any pneumothorax, focal opacity, pleural effusion, pulmonary edema, or widened mediastinum. A d-dimer was performed given her low risk of being on an OCP and her  4 months ago. This was negative and I have low clinical  suspicion for pulmonary embolism at this time. White blood cell count was normal. LFTs and lipase were completely normal and I have low clinical suspicion for gallbladder pathology or pancreatitis. She was noted to have a urinary tract infection, and was discharged home with antibiotics. There was sent for culture and she will follow up with these with her primary care physician. The patient declined any pain medication while in the emergency department. Upon reassessment, she is comfortable and repeat abdominal exam was benign. I believe her clinical presentation may be most consistent with musculoskeletal pain while lifting her son. She was discharged home and will follow-up with her primary care physician    The patient presents with abdominal pain without signs of peritonitis or other life-threatening or serious etiology. The patient appears stable for discharge and has been instructed to return immediately if the symptoms worsen in any way, or in 8-12hr if not improved for re-evaluation. The patient has been instructed to return if the symptoms worsen or change in any way.      FINAL IMPRESSION  1. Right upper quadrant abdominal pain    2. Chest pain, unspecified type    3. Urinary tract infection without hematuria, site unspecified        PRESCRIPTIONS  Discharge Medication List as of 10/1/2018  8:53 PM      START taking these medications    Details   sulfamethoxazole-trimethoprim (BACTRIM DS) 800-160 MG tablet Take 1 Tab by mouth 2 times a day for 7 days., Disp-14 Tab, R-0, Print Rx Paper             FOLLOW UP  [address] [address]  [phone]  Call in 1 day      [facility], Emergency Dept  [address] [facility] [id number] [phone]  Go to   As needed, If symptoms worsen        -DISCHARGE-           Electronically signed by: Chloe Vogel, 10/1/2018 6:58 PM  ED Provider Note

## 2018-10-02 NOTE — ED NOTES
Discharge information reviewed in detail. Pt verbalized understanding of discharge instructions to follow up with PCP and to return to ER if condition worsens.

## 2018-10-02 NOTE — ED NOTES
"Patient states that she was bent over the bed playing with her 4 month old when she developed a \"cramp\" in her lower right chest that has gotten worse with movement.  Radiating into the upper right chest and back.  Patient reports SOB when this occurs.  Patient states that the cramp is like a mild tightness currently.  "

## 2018-10-02 NOTE — ED TRIAGE NOTES
Pt BIB father  Sudden onset of severe cramping sharp pain to R U Q rib area   Happened while she was bending over her baby which was on the bed    No Hx of such   Recent delivery 4 months Took breath away  At it's worst was 10/10  Now a little better 5/10

## 2020-01-08 ENCOUNTER — HOSPITAL ENCOUNTER (OUTPATIENT)
Dept: LAB | Facility: MEDICAL CENTER | Age: 30
End: 2020-01-08
Attending: SPECIALIST
Payer: MEDICAID

## 2020-01-08 PROCEDURE — 87624 HPV HI-RISK TYP POOLED RSLT: CPT

## 2020-01-08 PROCEDURE — 87491 CHLMYD TRACH DNA AMP PROBE: CPT

## 2020-01-08 PROCEDURE — 87591 N.GONORRHOEAE DNA AMP PROB: CPT

## 2020-01-08 PROCEDURE — 88175 CYTOPATH C/V AUTO FLUID REDO: CPT

## 2020-05-14 ENCOUNTER — HOSPITAL ENCOUNTER (OUTPATIENT)
Dept: LAB | Facility: MEDICAL CENTER | Age: 30
End: 2020-05-14
Attending: SPECIALIST
Payer: MEDICAID

## 2020-05-14 PROCEDURE — 82731 ASSAY OF FETAL FIBRONECTIN: CPT

## 2020-05-15 LAB — FIBRONECTIN FETAL SPEC QL: NORMAL

## 2020-07-23 NOTE — H&P
DATE OF SCHEDULED SURGERY:  2020    REASON FOR SURGERY:  Elective repeat low transverse  section.    HISTORY OF PRESENT ILLNESS:  This is a 30-year-old  3, para 2 at 39 and   1/7th weeks' gestation based on a 9-week ultrasound, who elects to proceed   forward with elective repeat low transverse  section.  Risks, benefits   and alternatives have been addressed.  She has asked appropriate questions,   signed the appropriate consents, and is wishing to proceed forward with   surgery as planned.    PAST MEDICAL HISTORY:  Noncontributory.    PAST SURGICAL HISTORY:  1.  sections in  and .  2. Tonsillectomy and adenoidectomy.  3. Eustachian tubes as a child.    OBSTETRICAL HISTORY:  In  and , the patient had  sections at   term.  This is her third pregnancy.    SOCIAL HISTORY:  She denies use of any alcohol, tobacco, or recreational drug   use.  She does give marijuana use and alcohol use history in early pregnancy.    MEDICATIONS:  Prenatal vitamins.    ALLERGIES:  No known drug allergies.    PHYSICAL EXAMINATION:  VITAL SIGNS: Current vital signs can be seen in electronic medical record.  HEART:  Regular rate and rhythm.  CHEST:  Clear to auscultation bilaterally.  ABDOMEN:  Soft, gravid, nontender.  PELVIC:  Sterile vaginal exam is as stated above.  EXTREMITIES:  Nontender.    LABORATORY DATA:  Prenatal care labs were all in order.  She is group B strep   negative.    ASSESSMENT AND PLAN:  A 30-year-old  3, para 2 term with overall   reassuring fetal status, history of 2 previous  sections, now electing   to proceed forward with elective repeat low transverse  section.             ____________________________________     MD MINISTERIO HUI / NTS    DD:  2020 13:43:18  DT:  2020 14:12:33    D#:  [id number]  Job#:  [id number]

## 2020-08-05 ENCOUNTER — HOSPITAL ENCOUNTER (INPATIENT)
Facility: MEDICAL CENTER | Age: 30
LOS: 3 days | End: 2020-08-08
Attending: SPECIALIST | Admitting: SPECIALIST
Payer: MEDICAID

## 2020-08-05 ENCOUNTER — ANESTHESIA (OUTPATIENT)
Dept: OBGYN | Facility: MEDICAL CENTER | Age: 30
End: 2020-08-05
Payer: MEDICAID

## 2020-08-05 ENCOUNTER — ANESTHESIA EVENT (OUTPATIENT)
Dept: OBGYN | Facility: MEDICAL CENTER | Age: 30
End: 2020-08-05
Payer: MEDICAID

## 2020-08-05 DIAGNOSIS — R10.2 PELVIC PAIN: Primary | ICD-10-CM

## 2020-08-05 LAB
BASOPHILS # BLD AUTO: 0.4 % (ref 0–1.8)
BASOPHILS # BLD: 0.04 K/UL (ref 0–0.12)
COVID ORDER STATUS COVID19: NORMAL
EOSINOPHIL # BLD AUTO: 0.04 K/UL (ref 0–0.51)
EOSINOPHIL NFR BLD: 0.4 % (ref 0–6.9)
ERYTHROCYTE [DISTWIDTH] IN BLOOD BY AUTOMATED COUNT: 40 FL (ref 35.9–50)
ERYTHROCYTE [DISTWIDTH] IN BLOOD BY AUTOMATED COUNT: 40.2 FL (ref 35.9–50)
HCT VFR BLD AUTO: 27.9 % (ref 37–47)
HCT VFR BLD AUTO: 31.6 % (ref 37–47)
HGB BLD-MCNC: 10.6 G/DL (ref 12–16)
HGB BLD-MCNC: 9.1 G/DL (ref 12–16)
HOLDING TUBE BB 8507: NORMAL
IMM GRANULOCYTES # BLD AUTO: 0.05 K/UL (ref 0–0.11)
IMM GRANULOCYTES NFR BLD AUTO: 0.5 % (ref 0–0.9)
LYMPHOCYTES # BLD AUTO: 2.44 K/UL (ref 1–4.8)
LYMPHOCYTES NFR BLD: 22.3 % (ref 22–41)
MCH RBC QN AUTO: 28 PG (ref 27–33)
MCH RBC QN AUTO: 28 PG (ref 27–33)
MCHC RBC AUTO-ENTMCNC: 32.6 G/DL (ref 33.6–35)
MCHC RBC AUTO-ENTMCNC: 33.5 G/DL (ref 33.6–35)
MCV RBC AUTO: 83.6 FL (ref 81.4–97.8)
MCV RBC AUTO: 85.8 FL (ref 81.4–97.8)
MONOCYTES # BLD AUTO: 0.88 K/UL (ref 0–0.85)
MONOCYTES NFR BLD AUTO: 8.1 % (ref 0–13.4)
NEUTROPHILS # BLD AUTO: 7.47 K/UL (ref 2–7.15)
NEUTROPHILS NFR BLD: 68.3 % (ref 44–72)
NRBC # BLD AUTO: 0 K/UL
NRBC BLD-RTO: 0 /100 WBC
PLATELET # BLD AUTO: 191 K/UL (ref 164–446)
PLATELET # BLD AUTO: 193 K/UL (ref 164–446)
PMV BLD AUTO: 10.3 FL (ref 9–12.9)
PMV BLD AUTO: 10.6 FL (ref 9–12.9)
RBC # BLD AUTO: 3.25 M/UL (ref 4.2–5.4)
RBC # BLD AUTO: 3.78 M/UL (ref 4.2–5.4)
SARS-COV-2 RNA RESP QL NAA+PROBE: NOTDETECTED
SPECIMEN SOURCE: NORMAL
WBC # BLD AUTO: 10.9 K/UL (ref 4.8–10.8)
WBC # BLD AUTO: 19.5 K/UL (ref 4.8–10.8)

## 2020-08-05 PROCEDURE — 700102 HCHG RX REV CODE 250 W/ 637 OVERRIDE(OP): Performed by: ANESTHESIOLOGY

## 2020-08-05 PROCEDURE — A9270 NON-COVERED ITEM OR SERVICE: HCPCS | Performed by: SPECIALIST

## 2020-08-05 PROCEDURE — 160002 HCHG RECOVERY MINUTES (STAT): Performed by: SPECIALIST

## 2020-08-05 PROCEDURE — 700105 HCHG RX REV CODE 258: Performed by: ANESTHESIOLOGY

## 2020-08-05 PROCEDURE — 700102 HCHG RX REV CODE 250 W/ 637 OVERRIDE(OP): Performed by: SPECIALIST

## 2020-08-05 PROCEDURE — 700111 HCHG RX REV CODE 636 W/ 250 OVERRIDE (IP): Performed by: ANESTHESIOLOGY

## 2020-08-05 PROCEDURE — 700101 HCHG RX REV CODE 250: Performed by: ANESTHESIOLOGY

## 2020-08-05 PROCEDURE — 501445 HCHG STAPLER, SKIN DISP: Performed by: SPECIALIST

## 2020-08-05 PROCEDURE — 700112 HCHG RX REV CODE 229: Performed by: SPECIALIST

## 2020-08-05 PROCEDURE — 36415 COLL VENOUS BLD VENIPUNCTURE: CPT

## 2020-08-05 PROCEDURE — 770002 HCHG ROOM/CARE - OB PRIVATE (112)

## 2020-08-05 PROCEDURE — A9270 NON-COVERED ITEM OR SERVICE: HCPCS | Performed by: ANESTHESIOLOGY

## 2020-08-05 PROCEDURE — 160048 HCHG OR STATISTICAL LEVEL 1-5: Performed by: SPECIALIST

## 2020-08-05 PROCEDURE — 160029 HCHG SURGERY MINUTES - 1ST 30 MINS LEVEL 4: Performed by: SPECIALIST

## 2020-08-05 PROCEDURE — 160041 HCHG SURGERY MINUTES - EA ADDL 1 MIN LEVEL 4: Performed by: SPECIALIST

## 2020-08-05 PROCEDURE — 700111 HCHG RX REV CODE 636 W/ 250 OVERRIDE (IP)

## 2020-08-05 PROCEDURE — U0003 INFECTIOUS AGENT DETECTION BY NUCLEIC ACID (DNA OR RNA); SEVERE ACUTE RESPIRATORY SYNDROME CORONAVIRUS 2 (SARS-COV-2) (CORONAVIRUS DISEASE [COVID-19]), AMPLIFIED PROBE TECHNIQUE, MAKING USE OF HIGH THROUGHPUT TECHNOLOGIES AS DESCRIBED BY CMS-2020-01-R: HCPCS

## 2020-08-05 PROCEDURE — 700105 HCHG RX REV CODE 258

## 2020-08-05 PROCEDURE — 160009 HCHG ANES TIME/MIN: Performed by: SPECIALIST

## 2020-08-05 PROCEDURE — 160035 HCHG PACU - 1ST 60 MINS PHASE I: Performed by: SPECIALIST

## 2020-08-05 PROCEDURE — 85027 COMPLETE CBC AUTOMATED: CPT

## 2020-08-05 PROCEDURE — 85025 COMPLETE CBC W/AUTO DIFF WBC: CPT

## 2020-08-05 PROCEDURE — C9803 HOPD COVID-19 SPEC COLLECT: HCPCS | Performed by: SPECIALIST

## 2020-08-05 RX ORDER — MISOPROSTOL 200 UG/1
800 TABLET ORAL
Status: DISCONTINUED | OUTPATIENT
Start: 2020-08-05 | End: 2020-08-08 | Stop reason: HOSPADM

## 2020-08-05 RX ORDER — BUPIVACAINE HYDROCHLORIDE 7.5 MG/ML
INJECTION, SOLUTION INTRASPINAL
Status: COMPLETED | OUTPATIENT
Start: 2020-08-05 | End: 2020-08-05

## 2020-08-05 RX ORDER — DIPHENHYDRAMINE HYDROCHLORIDE 50 MG/ML
12.5 INJECTION INTRAMUSCULAR; INTRAVENOUS EVERY 6 HOURS PRN
Status: DISCONTINUED | OUTPATIENT
Start: 2020-08-05 | End: 2020-08-06 | Stop reason: HOSPADM

## 2020-08-05 RX ORDER — VITAMIN A ACETATE, BETA CAROTENE, ASCORBIC ACID, CHOLECALCIFEROL, .ALPHA.-TOCOPHEROL ACETATE, DL-, THIAMINE MONONITRATE, RIBOFLAVIN, NIACINAMIDE, PYRIDOXINE HYDROCHLORIDE, FOLIC ACID, CYANOCOBALAMIN, CALCIUM CARBONATE, FERROUS FUMARATE, ZINC OXIDE, CUPRIC OXIDE 3080; 12; 120; 400; 1; 1.84; 3; 20; 22; 920; 25; 200; 27; 10; 2 [IU]/1; UG/1; MG/1; [IU]/1; MG/1; MG/1; MG/1; MG/1; MG/1; [IU]/1; MG/1; MG/1; MG/1; MG/1; MG/1
1 TABLET, FILM COATED ORAL
Status: DISCONTINUED | OUTPATIENT
Start: 2020-08-05 | End: 2020-08-08 | Stop reason: HOSPADM

## 2020-08-05 RX ORDER — OXYCODONE HYDROCHLORIDE 5 MG/1
5 TABLET ORAL EVERY 4 HOURS PRN
Status: DISCONTINUED | OUTPATIENT
Start: 2020-08-05 | End: 2020-08-06 | Stop reason: HOSPADM

## 2020-08-05 RX ORDER — PHENYLEPHRINE HCL IN 0.9% NACL 0.5 MG/5ML
SYRINGE (ML) INTRAVENOUS PRN
Status: DISCONTINUED | OUTPATIENT
Start: 2020-08-05 | End: 2020-08-05 | Stop reason: SURG

## 2020-08-05 RX ORDER — HYDROMORPHONE HYDROCHLORIDE 1 MG/ML
0.4 INJECTION, SOLUTION INTRAMUSCULAR; INTRAVENOUS; SUBCUTANEOUS
Status: DISCONTINUED | OUTPATIENT
Start: 2020-08-05 | End: 2020-08-06 | Stop reason: HOSPADM

## 2020-08-05 RX ORDER — CEFAZOLIN SODIUM 1 G/3ML
INJECTION, POWDER, FOR SOLUTION INTRAMUSCULAR; INTRAVENOUS PRN
Status: DISCONTINUED | OUTPATIENT
Start: 2020-08-05 | End: 2020-08-05 | Stop reason: SURG

## 2020-08-05 RX ORDER — ONDANSETRON 2 MG/ML
4 INJECTION INTRAMUSCULAR; INTRAVENOUS
Status: DISCONTINUED | OUTPATIENT
Start: 2020-08-05 | End: 2020-08-05 | Stop reason: HOSPADM

## 2020-08-05 RX ORDER — DOCUSATE SODIUM 100 MG/1
100 CAPSULE, LIQUID FILLED ORAL 2 TIMES DAILY PRN
Status: DISCONTINUED | OUTPATIENT
Start: 2020-08-05 | End: 2020-08-08 | Stop reason: HOSPADM

## 2020-08-05 RX ORDER — SIMETHICONE 80 MG
80 TABLET,CHEWABLE ORAL 4 TIMES DAILY PRN
Status: DISCONTINUED | OUTPATIENT
Start: 2020-08-05 | End: 2020-08-08 | Stop reason: HOSPADM

## 2020-08-05 RX ORDER — METOCLOPRAMIDE HYDROCHLORIDE 5 MG/ML
10 INJECTION INTRAMUSCULAR; INTRAVENOUS ONCE
Status: COMPLETED | OUTPATIENT
Start: 2020-08-05 | End: 2020-08-05

## 2020-08-05 RX ORDER — SODIUM CHLORIDE, SODIUM GLUCONATE, SODIUM ACETATE, POTASSIUM CHLORIDE AND MAGNESIUM CHLORIDE 526; 502; 368; 37; 30 MG/100ML; MG/100ML; MG/100ML; MG/100ML; MG/100ML
INJECTION, SOLUTION INTRAVENOUS
Status: DISCONTINUED | OUTPATIENT
Start: 2020-08-05 | End: 2020-08-05 | Stop reason: SURG

## 2020-08-05 RX ORDER — MISOPROSTOL 200 UG/1
600 TABLET ORAL
Status: DISCONTINUED | OUTPATIENT
Start: 2020-08-05 | End: 2020-08-08 | Stop reason: HOSPADM

## 2020-08-05 RX ORDER — MEPERIDINE HYDROCHLORIDE 25 MG/ML
6.25 INJECTION INTRAMUSCULAR; INTRAVENOUS; SUBCUTANEOUS
Status: DISCONTINUED | OUTPATIENT
Start: 2020-08-05 | End: 2020-08-05 | Stop reason: HOSPADM

## 2020-08-05 RX ORDER — ONDANSETRON 2 MG/ML
INJECTION INTRAMUSCULAR; INTRAVENOUS PRN
Status: DISCONTINUED | OUTPATIENT
Start: 2020-08-05 | End: 2020-08-05 | Stop reason: SURG

## 2020-08-05 RX ORDER — CITRIC ACID/SODIUM CITRATE 334-500MG
30 SOLUTION, ORAL ORAL ONCE
Status: COMPLETED | OUTPATIENT
Start: 2020-08-05 | End: 2020-08-05

## 2020-08-05 RX ORDER — OXYTOCIN 10 [USP'U]/ML
INJECTION, SOLUTION INTRAMUSCULAR; INTRAVENOUS PRN
Status: DISCONTINUED | OUTPATIENT
Start: 2020-08-05 | End: 2020-08-05 | Stop reason: SURG

## 2020-08-05 RX ORDER — KETOROLAC TROMETHAMINE 30 MG/ML
INJECTION, SOLUTION INTRAMUSCULAR; INTRAVENOUS PRN
Status: DISCONTINUED | OUTPATIENT
Start: 2020-08-05 | End: 2020-08-05 | Stop reason: SURG

## 2020-08-05 RX ORDER — ONDANSETRON 2 MG/ML
4 INJECTION INTRAMUSCULAR; INTRAVENOUS EVERY 6 HOURS PRN
Status: DISCONTINUED | OUTPATIENT
Start: 2020-08-05 | End: 2020-08-06 | Stop reason: HOSPADM

## 2020-08-05 RX ORDER — HALOPERIDOL 5 MG/ML
1 INJECTION INTRAMUSCULAR
Status: DISCONTINUED | OUTPATIENT
Start: 2020-08-05 | End: 2020-08-05 | Stop reason: HOSPADM

## 2020-08-05 RX ORDER — HYDROMORPHONE HYDROCHLORIDE 1 MG/ML
0.2 INJECTION, SOLUTION INTRAMUSCULAR; INTRAVENOUS; SUBCUTANEOUS
Status: DISCONTINUED | OUTPATIENT
Start: 2020-08-05 | End: 2020-08-06 | Stop reason: HOSPADM

## 2020-08-05 RX ORDER — BISACODYL 10 MG
10 SUPPOSITORY, RECTAL RECTAL PRN
Status: DISCONTINUED | OUTPATIENT
Start: 2020-08-05 | End: 2020-08-08 | Stop reason: HOSPADM

## 2020-08-05 RX ORDER — DIPHENHYDRAMINE HYDROCHLORIDE 50 MG/ML
25 INJECTION INTRAMUSCULAR; INTRAVENOUS EVERY 6 HOURS PRN
Status: DISCONTINUED | OUTPATIENT
Start: 2020-08-05 | End: 2020-08-06 | Stop reason: HOSPADM

## 2020-08-05 RX ORDER — MORPHINE SULFATE 0.5 MG/ML
INJECTION, SOLUTION EPIDURAL; INTRATHECAL; INTRAVENOUS
Status: COMPLETED | OUTPATIENT
Start: 2020-08-05 | End: 2020-08-05

## 2020-08-05 RX ORDER — ACETAMINOPHEN 500 MG
1000 TABLET ORAL EVERY 6 HOURS
Status: COMPLETED | OUTPATIENT
Start: 2020-08-05 | End: 2020-08-06

## 2020-08-05 RX ORDER — SODIUM CHLORIDE, SODIUM LACTATE, POTASSIUM CHLORIDE, CALCIUM CHLORIDE 600; 310; 30; 20 MG/100ML; MG/100ML; MG/100ML; MG/100ML
INJECTION, SOLUTION INTRAVENOUS PRN
Status: DISCONTINUED | OUTPATIENT
Start: 2020-08-05 | End: 2020-08-08 | Stop reason: HOSPADM

## 2020-08-05 RX ORDER — DIPHENHYDRAMINE HYDROCHLORIDE 50 MG/ML
12.5 INJECTION INTRAMUSCULAR; INTRAVENOUS
Status: DISCONTINUED | OUTPATIENT
Start: 2020-08-05 | End: 2020-08-05 | Stop reason: HOSPADM

## 2020-08-05 RX ORDER — KETOROLAC TROMETHAMINE 30 MG/ML
30 INJECTION, SOLUTION INTRAMUSCULAR; INTRAVENOUS EVERY 6 HOURS
Status: DISCONTINUED | OUTPATIENT
Start: 2020-08-05 | End: 2020-08-06 | Stop reason: HOSPADM

## 2020-08-05 RX ORDER — SODIUM CHLORIDE, SODIUM GLUCONATE, SODIUM ACETATE, POTASSIUM CHLORIDE AND MAGNESIUM CHLORIDE 526; 502; 368; 37; 30 MG/100ML; MG/100ML; MG/100ML; MG/100ML; MG/100ML
500 INJECTION, SOLUTION INTRAVENOUS CONTINUOUS
Status: DISCONTINUED | OUTPATIENT
Start: 2020-08-05 | End: 2020-08-06 | Stop reason: HOSPADM

## 2020-08-05 RX ORDER — OXYCODONE HYDROCHLORIDE AND ACETAMINOPHEN 5; 325 MG/1; MG/1
1 TABLET ORAL
Status: DISCONTINUED | OUTPATIENT
Start: 2020-08-05 | End: 2020-08-05 | Stop reason: HOSPADM

## 2020-08-05 RX ORDER — OXYCODONE HYDROCHLORIDE 10 MG/1
10 TABLET ORAL EVERY 4 HOURS PRN
Status: DISCONTINUED | OUTPATIENT
Start: 2020-08-05 | End: 2020-08-06 | Stop reason: HOSPADM

## 2020-08-05 RX ORDER — SODIUM CHLORIDE, SODIUM LACTATE, POTASSIUM CHLORIDE, CALCIUM CHLORIDE 600; 310; 30; 20 MG/100ML; MG/100ML; MG/100ML; MG/100ML
INJECTION, SOLUTION INTRAVENOUS CONTINUOUS
Status: DISCONTINUED | OUTPATIENT
Start: 2020-08-05 | End: 2020-08-08 | Stop reason: HOSPADM

## 2020-08-05 RX ORDER — OXYCODONE HYDROCHLORIDE AND ACETAMINOPHEN 5; 325 MG/1; MG/1
2 TABLET ORAL
Status: DISCONTINUED | OUTPATIENT
Start: 2020-08-05 | End: 2020-08-05 | Stop reason: HOSPADM

## 2020-08-05 RX ORDER — SODIUM CHLORIDE, SODIUM GLUCONATE, SODIUM ACETATE, POTASSIUM CHLORIDE AND MAGNESIUM CHLORIDE 526; 502; 368; 37; 30 MG/100ML; MG/100ML; MG/100ML; MG/100ML; MG/100ML
1500 INJECTION, SOLUTION INTRAVENOUS ONCE
Status: COMPLETED | OUTPATIENT
Start: 2020-08-05 | End: 2020-08-05

## 2020-08-05 RX ADMIN — Medication 200 MCG: at 13:04

## 2020-08-05 RX ADMIN — BUPIVACAINE HYDROCHLORIDE IN DEXTROSE 1.8 ML: 7.5 INJECTION, SOLUTION SUBARACHNOID at 12:48

## 2020-08-05 RX ADMIN — SIMETHICONE 80 MG: 80 TABLET, CHEWABLE ORAL at 21:47

## 2020-08-05 RX ADMIN — ACETAMINOPHEN 1000 MG: 500 TABLET ORAL at 15:48

## 2020-08-05 RX ADMIN — MORPHINE SULFATE 150 MCG: 0.5 INJECTION, SOLUTION EPIDURAL; INTRATHECAL; INTRAVENOUS at 12:48

## 2020-08-05 RX ADMIN — CEFAZOLIN 2 G: 330 INJECTION, POWDER, FOR SOLUTION INTRAMUSCULAR; INTRAVENOUS at 12:45

## 2020-08-05 RX ADMIN — Medication 100 MCG: at 12:55

## 2020-08-05 RX ADMIN — ONDANSETRON 4 MG: 2 INJECTION INTRAMUSCULAR; INTRAVENOUS at 13:12

## 2020-08-05 RX ADMIN — FAMOTIDINE 20 MG: 10 INJECTION, SOLUTION INTRAVENOUS at 12:12

## 2020-08-05 RX ADMIN — OXYCODONE HYDROCHLORIDE 10 MG: 10 TABLET ORAL at 17:29

## 2020-08-05 RX ADMIN — KETOROLAC TROMETHAMINE 30 MG: 30 INJECTION, SOLUTION INTRAMUSCULAR at 13:23

## 2020-08-05 RX ADMIN — OXYTOCIN 125 ML/HR: 10 INJECTION, SOLUTION INTRAMUSCULAR; INTRAVENOUS at 13:52

## 2020-08-05 RX ADMIN — Medication 100 MCG: at 12:58

## 2020-08-05 RX ADMIN — SODIUM CHLORIDE, SODIUM GLUCONATE, SODIUM ACETATE, POTASSIUM CHLORIDE AND MAGNESIUM CHLORIDE: 526; 502; 368; 37; 30 INJECTION, SOLUTION INTRAVENOUS at 12:41

## 2020-08-05 RX ADMIN — DOCUSATE SODIUM 100 MG: 100 CAPSULE, LIQUID FILLED ORAL at 21:47

## 2020-08-05 RX ADMIN — KETOROLAC TROMETHAMINE 30 MG: 30 INJECTION, SOLUTION INTRAMUSCULAR at 19:33

## 2020-08-05 RX ADMIN — SODIUM CITRATE AND CITRIC ACID MONOHYDRATE 30 ML: 500; 334 SOLUTION ORAL at 12:12

## 2020-08-05 RX ADMIN — SODIUM CHLORIDE, SODIUM GLUCONATE, SODIUM ACETATE, POTASSIUM CHLORIDE AND MAGNESIUM CHLORIDE 1500 ML: 526; 502; 368; 37; 30 INJECTION, SOLUTION INTRAVENOUS at 10:40

## 2020-08-05 RX ADMIN — FENTANYL CITRATE 25 MCG: 50 INJECTION INTRAMUSCULAR; INTRAVENOUS at 13:16

## 2020-08-05 RX ADMIN — OXYTOCIN 20 UNITS: 10 INJECTION, SOLUTION INTRAMUSCULAR; INTRAVENOUS at 13:09

## 2020-08-05 RX ADMIN — EPHEDRINE SULFATE 10 MG: 50 INJECTION, SOLUTION INTRAVENOUS at 13:05

## 2020-08-05 RX ADMIN — ACETAMINOPHEN 1000 MG: 500 TABLET ORAL at 21:46

## 2020-08-05 RX ADMIN — METOCLOPRAMIDE 10 MG: 5 INJECTION, SOLUTION INTRAMUSCULAR; INTRAVENOUS at 12:12

## 2020-08-05 ASSESSMENT — PATIENT HEALTH QUESTIONNAIRE - PHQ9
2. FEELING DOWN, DEPRESSED, IRRITABLE, OR HOPELESS: NOT AT ALL
1. LITTLE INTEREST OR PLEASURE IN DOING THINGS: NOT AT ALL
SUM OF ALL RESPONSES TO PHQ9 QUESTIONS 1 AND 2: 0

## 2020-08-05 ASSESSMENT — LIFESTYLE VARIABLES: EVER_SMOKED: NEVER

## 2020-08-05 ASSESSMENT — PAIN SCALES - GENERAL
PAINLEVEL: 0 - NO PAIN
PAIN_LEVEL: 0

## 2020-08-05 ASSESSMENT — FIBROSIS 4 INDEX: FIB4 SCORE: 0.56

## 2020-08-05 NOTE — ANESTHESIA PROCEDURE NOTES
Spinal Block    Date/Time: 8/5/2020 12:48 PM  Performed by: Melissa Lane M.D.  Authorized by: Melissa Lane M.D.     Patient Location:  OB  Start Time:  8/5/2020 12:48 PM  End Time:  8/5/2020 12:58 PM  Reason for Block: primary anesthetic    patient identified, IV checked, site marked, risks and benefits discussed, surgical consent, monitors and equipment checked, pre-op evaluation and timeout performed    Patient Position:  Sitting  Prep: ChloraPrep    Monitoring:  Blood pressure, continuous pulse oximetry and heart rate  Approach:  Midline  Location:  L2-3  Injection Technique:  Single-shot  Skin infiltration:  Lidocaine  Strength:  1%  Dose:  2ml  Needle Type:  Pencan  Needle Gauge:  25 G  CSF flowing pre/post injection:  Yes  Sensory Level:  T6

## 2020-08-05 NOTE — OR SURGEON
Immediate Post OP Note    PreOp Diagnosis: Intrauterine pregnancy at 39 1/7 weeks gestation, previous  section X 2, desires elective repeat low transverse  section.    PostOp Diagnosis: Same; delivered    Procedure(s):   SECTION, REPEAT - Wound Class: Clean Contaminated    Surgeon(s):  SURESH Mattson M.D.    Anesthesiologist/Type of Anesthesia:  Anesthesiologist: Melissa Lane M.D./Spinal    Surgical Staff:  Circulator: Joyce Can R.N.  Scrub Person: Genesis Piper  L&D Baby  Nurse: Rasheeda Heck R.N.    Specimens removed if any:  None    Estimated Blood Loss: 500ccs    Findings: Normal appearing uterus fallopian tubes and ovaries, vigorous male infant with Apgars of 8/9 at one and five minutes respectively, placenta delivered spontaneous and intact with 3vc.    Complications: None        2020 1:36 PM Yonatan Sanders M.D.

## 2020-08-05 NOTE — OP REPORT
DATE OF SERVICE:  2020     PREOPERATIVE DIAGNOSES:  Intrauterine pregnancy at 39 1/7 weeks gestation, previous  section X 2, desires elective repeat low transverse  section.     POSTOPERATIVE DIAGNOSES: Same; delivered     PROCEDURE PERFORMED:  Repeat low transverse  section.     SURGEON:  Yonatan Sanders MD     ASSISTANT:  Gunnar Chávez MD.     ANESTHESIA:  Spinal     ANESTHESIOLOGIST:  Anesthesiologist: Melissa Lane M.D.     ESTIMATED BLOOD LOSS FOR THE PROCEDURE: 500 mL.     FINDINGS:  Normal uterus, tubes, ovaries.  Vigorous  male infant with Apgars of 8 and 9  at 1 and 5 minutes respectively.  Placenta intact with 3-vessel cord.     DESCRIPTION OF PROCEDURE:  The patient was taken to the operating room where    spinal anesthetic was placed without difficulty.  The patient was then prepped  and draped in the usual sterile fashion.  A Pfannenstiel skin incision was    made and carried down sharply through the previous skin incision down to the    level of the rectus fascia.  Rectus fascia was nicked in the midline.  Fascial   incision was extended laterally in both directions with the use of Aquino    scissors.  Kocher clamps were placed on the inferior and superior edge of the    rectus fascia, which was used to dissect away from the underlying rectus    muscles.  Rectus muscles were  in the midline.  Peritoneum was    entered sharply.  Peritoneal incision was extended superior and inferior    carefully avoiding the bladder.  Bladder blade was placed.  Vesicouterine    peritoneum was grasped, incised, the bladder flap was created.  Uterine    incision was made with the use of scalpel and extended laterally in both    directions.  Clear amniotic fluid was noted upon entry into the uterine    cavity.  With gentle fundal pressure, the infant's head was delivered.      No nuchal cord was reduced noted by easy delivery of the shoulders and    body.  Cord was clamped and cut.   Infant was handed to waiting nursing staff.    Apgars were 8 and 9 at 1 and 5 minutes respectively.  Placenta was then  delivered intact with 3-vessel cord.  The uterus was then brought into maternal abdomen, wrapped in a wet lap sponge, dry gauze curetted.  Uterine incision was reapproximated with #1 chromic in a running interlocking fashion    in one segment.  Excellent hemostasis was appreciated.  The uterus was then    returned back to the maternal abdomen.  Gutters were freed of clots.  Uterine    incision and subfascial space were noted to be hemostatic.  Peritoneum was    then reapproximated with 2-0 Vicryl running fashion in one segment.  The    fascia was then reapproximated with 0 Vicryl in running fashion in one    segment.  Subcutaneous tissue was irrigated.  Hemostasis was achieved with the   use of electrocautery.  Skin was closed with a 4-0 subcuticular stitch.     Patient tolerated the procedure well and was taken to the recovery in stable    condition.        ____________________________________     EFRAÍN RAMIREZ MD

## 2020-08-05 NOTE — ANESTHESIA POSTPROCEDURE EVALUATION
Patient: Rachel Green    Procedure Summary     Date: 20 Room / Location: [facility] OR 01 / LABOR AND DELIVERY    Anesthesia Start: 1241 Anesthesia Stop: 134    Procedure:  SECTION, REPEAT (Abdomen) Diagnosis: (PREVIOUS , 39+1 WEEKS)    Surgeon: Yonatan Sanders M.D. Responsible Provider: Melissa Lane M.D.    Anesthesia Type: spinal ASA Status: 2          Final Anesthesia Type: spinal  Last vitals  BP   Blood Pressure: 130/78    Temp   36.6 °C (97.9 °F)    Pulse   Pulse: 96   Resp   18    SpO2   98 %      Anesthesia Post Evaluation    Patient location during evaluation: PACU  Patient participation: complete - patient participated  Level of consciousness: awake and alert  Pain score: 0    Airway patency: patent  Anesthetic complications: no  Cardiovascular status: adequate and hemodynamically stable  Respiratory status: acceptable and room air  Hydration status: acceptable    PONV: none

## 2020-08-05 NOTE — PROGRESS NOTES
Pt presents to L&D for scheduled R C/S. Pt ambulated to LDA 6 for admission.     1110 Unable to get a hold of lab regarding COVID swab. Will try again.     1115 Unable to get a hold of lab regarding COVID swab.     1125 Sebastian in micro has not received swab, will follow up with sample handling.     1130 Sebastian called to update on COVID swab, sampling held it at the front. Will run ASAP. ETA aprox. 60 mins.     1135 Dr. Sanders updated on delay    1136 Dr. Chávez updated on delay    1237 Pt ambulated to OR 2    1342 Pt stable and transferred to PACU via gurney    1445 Pt stable and transferred to PP via gurney    1450 Report given to Bree/Chloe AGUILAR, POC discussed.

## 2020-08-05 NOTE — PROGRESS NOTES
Received patient to room 354 via [facility]. Report received from LAUREN Cook and assumed care of patient. Nursing assessment completed. Patient denies pain. Oriented to room, call light, infant security, emergency light, visiting hours and unit routine. Plan of care discussed. Encouraged to call with any needs, questions or concerns.   Shift chart check complete. Orders reviewed.

## 2020-08-05 NOTE — ANESTHESIA TIME REPORT
Anesthesia Start and Stop Event Times     Date Time Event    2020 1213 Ready for Procedure     1241 Anesthesia Start        Responsible Staff  20    Name Role Begin End    Melissa Lane M.D. Anesth 1241         Preop Diagnosis (Free Text):  Pre-op Diagnosis     PREVIOUS , 39+1 WEEKS        Preop Diagnosis (Codes):    Post op Diagnosis  Pregnancy      Premium Reason  Non-Premium    Comments:

## 2020-08-05 NOTE — ANESTHESIA QCDR
2019 [facility] Clinical Data Registry (for Quality Improvement)     Postoperative nausea/vomiting risk protocol (Adult = 18 yrs and Pediatric 3-17 yrs)- (430 and 463)  General inhalation anesthetic (NOT TIVA) with PONV risk factors: No  Provision of anti-emetic therapy with at least 2 different classes of agents: N/A  Patient DID NOT receive anti-emetic therapy and reason is documented in Medical Record: N/A    Multimodal Pain Management- (477)  Non-emergent surgery AND patient age >= 18: Yes  Use of Multimodal Pain Management, two or more drugs and/or interventions, NOT including systemic opioids: Yes  Exception: Documented allergy to multiple classes of analgesics: N/A    Smoking Abstinence (404)  Patient is current smoker (cigarette, pipe, e-cig, marijuanna): No  Elective Surgery:   Abstinence instructions provided prior to day of surgery:   Patient abstained from smoking on day of surgery:     Pre-Op Beta-Blocker in Isolated CABG (44)  Isolated CABG AND patient age >= 18: No  Beta-blocker admin within 24 hours of surgical incision:   Exception:of medical reason(s) for not administering beta blocker within 24 hours prior to surgical incision (e.g., not  indicated,other medical reason):     PACU assessment of acute postoperative pain prior to Anesthesia Care End- Applies to Patients Age = 18- (ABG7)  Initial PACU pain score is which of the following: < 7/10  Patient unable to report pain score: N/A    Post-anesthetic transfer of care checklist/protocol to PACU/ICU- (426 and 427)  Upon conclusion of case, patient transferred to which of the following locations: PACU/Non-ICU  Use of transfer checklist/protocol: Yes  Exclusion: Service Performed in Patient Hospital Room (and thus did not require transfer): N/A  Unplanned admission to ICU related to anesthesia service up through end of PACU care- (MD51)  Unplanned admission to ICU (not initially anticipated at anesthesia start time): No

## 2020-08-06 PROCEDURE — A9270 NON-COVERED ITEM OR SERVICE: HCPCS | Performed by: SPECIALIST

## 2020-08-06 PROCEDURE — 700102 HCHG RX REV CODE 250 W/ 637 OVERRIDE(OP): Performed by: SPECIALIST

## 2020-08-06 PROCEDURE — 700111 HCHG RX REV CODE 636 W/ 250 OVERRIDE (IP): Performed by: ANESTHESIOLOGY

## 2020-08-06 PROCEDURE — 700112 HCHG RX REV CODE 229: Performed by: SPECIALIST

## 2020-08-06 PROCEDURE — 770002 HCHG ROOM/CARE - OB PRIVATE (112)

## 2020-08-06 PROCEDURE — 700102 HCHG RX REV CODE 250 W/ 637 OVERRIDE(OP): Performed by: ANESTHESIOLOGY

## 2020-08-06 PROCEDURE — A9270 NON-COVERED ITEM OR SERVICE: HCPCS | Performed by: ANESTHESIOLOGY

## 2020-08-06 RX ORDER — ONDANSETRON 2 MG/ML
4 INJECTION INTRAMUSCULAR; INTRAVENOUS EVERY 6 HOURS PRN
Status: DISCONTINUED | OUTPATIENT
Start: 2020-08-06 | End: 2020-08-08 | Stop reason: HOSPADM

## 2020-08-06 RX ORDER — HYDROCODONE BITARTRATE AND ACETAMINOPHEN 5; 325 MG/1; MG/1
1 TABLET ORAL EVERY 4 HOURS PRN
Status: DISCONTINUED | OUTPATIENT
Start: 2020-08-06 | End: 2020-08-08 | Stop reason: HOSPADM

## 2020-08-06 RX ORDER — DIPHENHYDRAMINE HYDROCHLORIDE 50 MG/ML
25 INJECTION INTRAMUSCULAR; INTRAVENOUS EVERY 6 HOURS PRN
Status: DISCONTINUED | OUTPATIENT
Start: 2020-08-06 | End: 2020-08-08 | Stop reason: HOSPADM

## 2020-08-06 RX ORDER — ACETAMINOPHEN 325 MG/1
325 TABLET ORAL EVERY 4 HOURS PRN
Status: DISCONTINUED | OUTPATIENT
Start: 2020-08-06 | End: 2020-08-08 | Stop reason: HOSPADM

## 2020-08-06 RX ORDER — METOCLOPRAMIDE HYDROCHLORIDE 5 MG/ML
10 INJECTION INTRAMUSCULAR; INTRAVENOUS EVERY 6 HOURS PRN
Status: DISCONTINUED | OUTPATIENT
Start: 2020-08-06 | End: 2020-08-08 | Stop reason: HOSPADM

## 2020-08-06 RX ORDER — DIPHENHYDRAMINE HCL 25 MG
25 TABLET ORAL EVERY 6 HOURS PRN
Status: DISCONTINUED | OUTPATIENT
Start: 2020-08-06 | End: 2020-08-08 | Stop reason: HOSPADM

## 2020-08-06 RX ORDER — MORPHINE SULFATE 4 MG/ML
4 INJECTION, SOLUTION INTRAMUSCULAR; INTRAVENOUS
Status: DISCONTINUED | OUTPATIENT
Start: 2020-08-06 | End: 2020-08-08 | Stop reason: HOSPADM

## 2020-08-06 RX ORDER — IBUPROFEN 600 MG/1
600 TABLET ORAL EVERY 6 HOURS PRN
Status: DISCONTINUED | OUTPATIENT
Start: 2020-08-06 | End: 2020-08-08 | Stop reason: HOSPADM

## 2020-08-06 RX ORDER — HYDROCODONE BITARTRATE AND ACETAMINOPHEN 10; 325 MG/1; MG/1
1 TABLET ORAL EVERY 4 HOURS PRN
Status: DISCONTINUED | OUTPATIENT
Start: 2020-08-06 | End: 2020-08-08 | Stop reason: HOSPADM

## 2020-08-06 RX ORDER — KETOROLAC TROMETHAMINE 30 MG/ML
30 INJECTION, SOLUTION INTRAMUSCULAR; INTRAVENOUS EVERY 6 HOURS
Status: DISCONTINUED | OUTPATIENT
Start: 2020-08-06 | End: 2020-08-06

## 2020-08-06 RX ORDER — ONDANSETRON 4 MG/1
4 TABLET, ORALLY DISINTEGRATING ORAL EVERY 6 HOURS PRN
Status: DISCONTINUED | OUTPATIENT
Start: 2020-08-06 | End: 2020-08-08 | Stop reason: HOSPADM

## 2020-08-06 RX ADMIN — ACETAMINOPHEN 1000 MG: 500 TABLET ORAL at 08:34

## 2020-08-06 RX ADMIN — DOCUSATE SODIUM 100 MG: 100 CAPSULE, LIQUID FILLED ORAL at 10:22

## 2020-08-06 RX ADMIN — HYDROCODONE BITARTRATE AND ACETAMINOPHEN 1 TABLET: 10; 325 TABLET ORAL at 22:35

## 2020-08-06 RX ADMIN — IBUPROFEN 600 MG: 600 TABLET, FILM COATED ORAL at 14:41

## 2020-08-06 RX ADMIN — KETOROLAC TROMETHAMINE 30 MG: 30 INJECTION, SOLUTION INTRAMUSCULAR at 02:31

## 2020-08-06 RX ADMIN — KETOROLAC TROMETHAMINE 30 MG: 30 INJECTION, SOLUTION INTRAMUSCULAR at 08:34

## 2020-08-06 RX ADMIN — HYDROCODONE BITARTRATE AND ACETAMINOPHEN 1 TABLET: 10; 325 TABLET ORAL at 17:24

## 2020-08-06 RX ADMIN — OXYCODONE 5 MG: 5 TABLET ORAL at 10:23

## 2020-08-06 RX ADMIN — PRENATAL WITH FERROUS FUM AND FOLIC ACID 1 TABLET: 3080; 920; 120; 400; 22; 1.84; 3; 20; 10; 1; 12; 200; 27; 25; 2 TABLET ORAL at 08:34

## 2020-08-06 RX ADMIN — HYDROCODONE BITARTRATE AND ACETAMINOPHEN 1 TABLET: 5; 325 TABLET ORAL at 14:41

## 2020-08-06 RX ADMIN — IBUPROFEN 600 MG: 600 TABLET, FILM COATED ORAL at 20:51

## 2020-08-06 RX ADMIN — ACETAMINOPHEN 1000 MG: 500 TABLET ORAL at 03:47

## 2020-08-06 ASSESSMENT — EDINBURGH POSTNATAL DEPRESSION SCALE (EPDS)
I HAVE BLAMED MYSELF UNNECESSARILY WHEN THINGS WENT WRONG: YES, SOME OF THE TIME
I HAVE BEEN SO UNHAPPY THAT I HAVE BEEN CRYING: ONLY OCCASIONALLY
THE THOUGHT OF HARMING MYSELF HAS OCCURRED TO ME: NEVER
I HAVE FELT SCARED OR PANICKY FOR NO GOOD REASON: YES, SOMETIMES
I HAVE BEEN ABLE TO LAUGH AND SEE THE FUNNY SIDE OF THINGS: AS MUCH AS I ALWAYS COULD
I HAVE BEEN ANXIOUS OR WORRIED FOR NO GOOD REASON: YES, SOMETIMES
I HAVE FELT SAD OR MISERABLE: NOT VERY OFTEN
I HAVE BEEN SO UNHAPPY THAT I HAVE HAD DIFFICULTY SLEEPING: NOT VERY OFTEN
I HAVE LOOKED FORWARD WITH ENJOYMENT TO THINGS: AS MUCH AS I EVER DID
THINGS HAVE BEEN GETTING ON TOP OF ME: NO, MOST OF THE TIME I HAVE COPED QUITE WELL

## 2020-08-06 NOTE — DISCHARGE PLANNING
Discharge Planning Assessment Post Partum    Reason for Referral: MOB scored a 10 on the EPDS screen  Address: [facility] [place] [address]  Phone: [phone]   Type of Living Situation: living with FOB and children  Mom Diagnosis: Pregnancy,   Baby Diagnosis:   Primary Language: MOB speaks English    Name of Baby: Conner Mercado (: 20)  Father of the Baby: Vasyl Mercado  Involved in baby’s care? Yes  Contact Information: [phone]    Prenatal Care: Yes  Mom's PCP: None  PCP for new baby: Dr. Conroy    Support System: FOB  Coping/Bonding between mother & baby: Yes  Source of Feeding: breast feeding  Supplies for Infant: prepared for infant; denies any needs    Mom's Insurance: [employer] Medicaid  Baby Covered on Insurance: Yes  Mother Employed/School: [employer]  Other children in the home/names & ages: 13 year old daughter and 2 year old son    Financial Hardship/Income: denies   Mom's Mental status: alert and oriented  Services used prior to admit: Medicaid and WIC    CPS History: No  Psychiatric History: history of post partum depression. Provided MOB with a list of counseling resources specializing in post partum depression  Domestic Violence History: No  Drug/ETOH History: No    Resources Provided: post partum support and counseling resources and a list of children and family resources  Referrals Made: diaper bank referral     Clearance for Discharge: Infant is cleared to discharge home with mother.

## 2020-08-06 NOTE — CARE PLAN
Problem: Venous Thromboembolism (VTW)/Deep Vein Thrombosis (DVT) Prevention:  Goal: Patient will participate in Venous Thrombosis (VTE)/Deep Vein Thrombosis (DVT)Prevention Measures  Outcome: PROGRESSING AS EXPECTED  Flowsheets  Taken 8/6/2020 0800 by Nicole Zambrano R.N.  Risk Assessment Score: 2  VTE RISK: Moderate  Mechanical Prophylaxis: (pt ambulating) SCDs, Sequential Compression Device  SCDs, Sequential Compression Device: Off  Taken 8/5/2020 2000 by Brittnee Gracia R.N.  Pharmacologic Prophylaxis Used: Not Appropriate for Age  Note: Patient ambulating on own and denies pain in lower legs     Problem: Communication  Goal: The ability to communicate needs accurately and effectively will improve  Outcome: MET  Note: Plan of care reviewed including provider roles, health history, IV and medications, labs and tests, and discharge planning. Patient assured that they may ask questions at any time and should always let staff know if they are having difficulty breathing, pain or any discomfort at any time

## 2020-08-06 NOTE — PROGRESS NOTES
2000 Pt doing well bonding with baby, with burns catheter in placed, with sequential stocking bilaterally, Assessment done wound dressing clean dry and intact, Pt denies pain at this time, Encourage early mobilization, Encourage to call for assistance, Needs attended.

## 2020-08-06 NOTE — CARE PLAN
Problem: Alteration in comfort related to surgical incision and/or after birth pains  Goal: Patient is able to ambulate, care for self and infant with acceptable pain level  Intervention: Assess 0-10 pain level with vital signs  Note: Pain controlled

## 2020-08-06 NOTE — CARE PLAN
Problem: Altered physiologic condition related to postoperative  delivery  Goal: Patient physiologically stable as evidenced by normal lochia, palpable uterine involution and vital signs within normal limits  Intervention: Massage fundus as necessary to prevent excessive lochia  Note: Fundal massage done with light bleeding

## 2020-08-06 NOTE — PROGRESS NOTES
Progress Note    Subjective:   Doing well. No issues or concerns. Pain well controlled. No sig vaginal bleeding or pelvic pain.     Objective Data:  Recent Labs     08/05/20  1040 08/05/20  2240   WBC 10.9* 19.5*   RBC 3.78* 3.25*   HEMOGLOBIN 10.6* 9.1*   HEMATOCRIT 31.6* 27.9*   MCV 83.6 85.8   MCH 28.0 28.0   MCHC 33.5* 32.6*   RDW 40.0 40.2   PLATELETCT 191 193   MPV 10.3 10.6           Vitals:    08/06/20 0200 08/06/20 0300 08/06/20 0400 08/06/20 0600   BP: 116/69   118/77   Pulse: 68 80 78 74   Resp: 17 18 18 17   Temp: 36.7 °C (98 °F)   36.4 °C (97.6 °F)   TempSrc: Temporal   Temporal   SpO2: 99% 97%  97%   Weight:       Height:         Abdomen: soft non tender fundus at umbilicus with covered incision  Perineum: no sig bleeding or discharge  Ext: non tender calves    Intake/Output Summary (Last 24 hours) at 8/6/2020 1222  Last data filed at 8/6/2020 0800  Gross per 24 hour   Intake 2904.3 ml   Output 3400 ml   Net -495.7 ml       Current Facility-Administered Medications   Medication Dose Route Frequency Provider Last Rate Last Dose   • LR infusion   Intravenous Continuous Yonatan Sanders M.D.   Stopped at 08/05/20 1415   • oxytocin (PITOCIN) infusion (for postpartum)  2,000 mL/hr Intravenous Once Yonatan Sanders M.D.        Followed by   • oxytocin (PITOCIN) infusion (for postpartum)   mL/hr Intravenous Continuous Yonatan aSnders M.D. 125 mL/hr at 08/05/20 1500 125 mL/hr at 08/05/20 1500   • miSOPROStol (CYTOTEC) tablet 800 mcg  800 mcg Rectal Once PRN Yonatan Sanders M.D.       • LR infusion   Intravenous PRN Yonatan Sanders M.D.       • miSOPROStol (CYTOTEC) tablet 600 mcg  600 mcg Rectal Once PRN Yonatan Sanders M.D.       • docusate sodium (COLACE) capsule 100 mg  100 mg Oral BID PRN Yonatan Sanders M.D.   100 mg at 08/06/20 1022   • bisacodyl (DULCOLAX) suppository 10 mg  10 mg Rectal PRN Yonatan Sanders M.D.       • simethicone (MYLICON) chewable tab 80 mg  80 mg Oral 4X/DAY PRN Yonatan MANLEY  SURESH Sanders   80 mg at 08/05/20 2147   • prenatal plus vitamin (STUARTNATAL 1+1) 27-1 MG tablet 1 Tab  1 Tab Oral Daily-0800 Yonatan Sanders M.D.   1 Tab at 08/06/20 0834   • magnesium hydroxide (MILK OF MAGNESIA) suspension 30 mL  30 mL Oral Q6HRS PRN Yonatan Sanders M.D.       • electrolyte-A (PLASMALYTE-A) infusion 500 mL  500 mL Intravenous Continuous Melissa Lane M.D.   Stopped at 08/05/20 1415   • ketorolac (TORADOL) injection 30 mg  30 mg Intravenous Q6HR Melissa Lane M.D.   30 mg at 08/06/20 0834   • oxyCODONE immediate-release (ROXICODONE) tablet 5 mg  5 mg Oral Q4HRS PRN Mleissa Lane M.D.   5 mg at 08/06/20 1023   • oxyCODONE immediate release (ROXICODONE) tablet 10 mg  10 mg Oral Q4HRS PRN Melissa Lane M.D.   10 mg at 08/05/20 1729   • HYDROmorphone pf (DILAUDID) injection 0.2 mg  0.2 mg Intravenous Q2HRS PRJUAN RAMON Lane M.D.       • HYDROmorphone pf (DILAUDID) injection 0.4 mg  0.4 mg Intravenous Q2HRS PRJUAN RAMON Lane M.D.       • ePHEDrine injection 10 mg  10 mg Intravenous Q5 MIN PRJUAN RAMON Lane M.D.       • ondansetron (ZOFRAN) syringe/vial injection 4 mg  4 mg Intravenous Q6HRS PRN Melissa Lane M.D.       • diphenhydrAMINE (BENADRYL) injection 12.5 mg  12.5 mg Intravenous Q6HRS PRN Melissa Lane M.D.       • naloxone (NARCAN) 0.4 mg in NS 1,000 mL infusion  0.25 mcg/kg/hr Intravenous PRN Melissa Lane M.D.       • diphenhydrAMINE (BENADRYL) injection 12.5 mg  12.5 mg Intravenous Q6HRS PRN Melissa Lane M.D.        Or   • diphenhydrAMINE (BENADRYL) injection 25 mg  25 mg Intravenous Q6HRS PRN Melissa Lane M.D.        Or   • naloxone (NARCAN) 0.4 mg in NS 1,000 mL infusion  0.4 mg Intravenous PRN Melissa Lane M.D.           A/P S/P Repeat LTCS now POD # 1. Plan to proceed with the usual pp and post operative management today.

## 2020-08-06 NOTE — CARE PLAN
Problem: Altered physiologic condition related to postoperative  delivery  Goal: Patient physiologically stable as evidenced by normal lochia, palpable uterine involution and vital signs within normal limits  Outcome: PROGRESSING AS EXPECTED     Problem: Potential for postpartum infection related to surgical incision, compromised uterine condition, urinary tract or respiratory compromise  Goal: Patient will be afebrile and free from signs and symptoms of infection  Outcome: PROGRESSING AS EXPECTED     Problem: Alteration in comfort related to surgical incision and/or after birth pains  Goal: Patient is able to ambulate, care for self and infant with acceptable pain level  Outcome: PROGRESSING AS EXPECTED  Note: Tylenol and Toradol ATC. Tylenol given on admission; Oxycodone given at 1730; Toradol scheduled for

## 2020-08-07 PROCEDURE — 700112 HCHG RX REV CODE 229: Performed by: SPECIALIST

## 2020-08-07 PROCEDURE — A9270 NON-COVERED ITEM OR SERVICE: HCPCS | Performed by: SPECIALIST

## 2020-08-07 PROCEDURE — 700102 HCHG RX REV CODE 250 W/ 637 OVERRIDE(OP): Performed by: SPECIALIST

## 2020-08-07 PROCEDURE — 770002 HCHG ROOM/CARE - OB PRIVATE (112)

## 2020-08-07 RX ADMIN — IBUPROFEN 600 MG: 600 TABLET, FILM COATED ORAL at 02:55

## 2020-08-07 RX ADMIN — IBUPROFEN 600 MG: 600 TABLET, FILM COATED ORAL at 20:48

## 2020-08-07 RX ADMIN — HYDROCODONE BITARTRATE AND ACETAMINOPHEN 1 TABLET: 10; 325 TABLET ORAL at 02:55

## 2020-08-07 RX ADMIN — HYDROCODONE BITARTRATE AND ACETAMINOPHEN 1 TABLET: 10; 325 TABLET ORAL at 13:15

## 2020-08-07 RX ADMIN — HYDROCODONE BITARTRATE AND ACETAMINOPHEN 1 TABLET: 10; 325 TABLET ORAL at 22:35

## 2020-08-07 RX ADMIN — HYDROCODONE BITARTRATE AND ACETAMINOPHEN 1 TABLET: 10; 325 TABLET ORAL at 08:52

## 2020-08-07 RX ADMIN — IBUPROFEN 600 MG: 600 TABLET, FILM COATED ORAL at 13:15

## 2020-08-07 RX ADMIN — PRENATAL WITH FERROUS FUM AND FOLIC ACID 1 TABLET: 3080; 920; 120; 400; 22; 1.84; 3; 20; 10; 1; 12; 200; 27; 25; 2 TABLET ORAL at 08:52

## 2020-08-07 RX ADMIN — DOCUSATE SODIUM 100 MG: 100 CAPSULE, LIQUID FILLED ORAL at 08:52

## 2020-08-07 RX ADMIN — HYDROCODONE BITARTRATE AND ACETAMINOPHEN 1 TABLET: 10; 325 TABLET ORAL at 18:13

## 2020-08-07 NOTE — CARE PLAN
Problem: Alteration in comfort related to surgical incision and/or after birth pains  Goal: Patient is able to ambulate, care for self and infant with acceptable pain level  Intervention: Administer pain meds as requested by patient and ordered by MD/DO/CNM  Note: Pain medicine given as requested

## 2020-08-07 NOTE — LACTATION NOTE
This note was copied from a baby's chart.  @0995 met with MOB, MOB states baby has been breastfeeding well, she denies pain when she breastfeeds, she states she is concerned that she doesn't know how to tell if baby is getting enough milk from breastfeeding alone, she states she is concerned because her older child needed supplementation initially until her milk initially came in, she states she had a full/adequate milk supply after that    Baby was 39.1 weeks gestation at delivery, birth weight was 8# 2.2 oz, current weight loss is 6.51%, baby has been voiding and stooling, assured MOB that at this time weight loss is within expected levels    Educated on normal  behaviors and sleep-wake cycle, educated on expected feeding frequency and duration, educated on expected urine and stool output, educated on the benefits of [id number], encouraged frequent [id number] and especially during feeding attempts, educated on signs that would indicate need for supplementation    MOB was able to latch baby easily while LC was present, a nutritive suck was noted, a deep latch with widely-flanged lips was noted    Plan:  Ad adi breastfeeding at least Q 3-4 hours, more often if feeding cues noted  [id number]    Injoy aidan provided and explained    Written and verbal information provided on outpatient breastfeeding assistance available at [facility] after [facility] and encouraged to call to schedule consult as needed, informed that [facility] is on hold for the time being but if interested in attending to check the hospital web site for information on when it will resume, zoom meeting information provided as well    MOB states she has pump for home use if needed, she states she has WIC and she is aware of assistance available at [facility] after discharge, instructed to seek ongoing assistance with breastfeeding from her WIC counselor as needed after discharge    Encouraged to call for assistance as  needed

## 2020-08-07 NOTE — PROGRESS NOTES
Progress Note    Subjective:   Doing well. No issues or concerns. Pain well controlled. No sig bleeding or discharge was noted.    Objective Data:  Recent Labs     08/05/20  1040 08/05/20  2240   WBC 10.9* 19.5*   RBC 3.78* 3.25*   HEMOGLOBIN 10.6* 9.1*   HEMATOCRIT 31.6* 27.9*   MCV 83.6 85.8   MCH 28.0 28.0   MCHC 33.5* 32.6*   RDW 40.0 40.2   PLATELETCT 191 193   MPV 10.3 10.6           Vitals:    08/06/20 0400 08/06/20 0600 08/06/20 1749 08/07/20 0600   BP:  118/77 132/82 117/74   Pulse: 78 74 82 62   Resp: 18 17 18 16   Temp:  36.4 °C (97.6 °F) 36.7 °C (98 °F) 36.3 °C (97.3 °F)   TempSrc:  Temporal Temporal Temporal   SpO2:  97%  95%   Weight:       Height:         Abdomen: soft non tender fundus with c/d/i incision without any erythema or induration  P[erineum: no sig bleeding or discharge  Ext:non tender calves    No intake or output data in the 24 hours ending 08/07/20 0810    Current Facility-Administered Medications   Medication Dose Route Frequency Provider Last Rate Last Dose   • ibuprofen (MOTRIN) tablet 600 mg  600 mg Oral Q6HRS PRJUAN RAMON Sanders M.D.   600 mg at 08/07/20 0255   • acetaminophen (TYLENOL) tablet 325 mg  325 mg Oral Q4HRS PRJUAN RAMON Sanders M.D.       • HYDROcodone-acetaminophen (NORCO) 5-325 MG per tablet 1 Tab  1 Tab Oral Q4HRS PRJUAN RAMON Sanders M.D.   1 Tab at 08/06/20 1441   • HYDROcodone/acetaminophen (NORCO)  MG per tablet 1 Tab  1 Tab Oral Q4HRS PRJUAN RAMON Sanders M.D.   1 Tab at 08/07/20 0255   • morphine (pf) 4 MG/ML injection 4 mg  4 mg Intramuscular Q3HRS PRJUAN RAMON Sanders M.D.       • ondansetron (ZOFRAN) syringe/vial injection 4 mg  4 mg Intravenous Q6HRS PRN Yonatan Sanders M.D.        Or   • ondansetron (ZOFRAN ODT) dispertab 4 mg  4 mg Oral Q6HRS PRN Yonatan Sanders M.D.       • metoclopramide (REGLAN) injection 10 mg  10 mg Intravenous Q6HRS PRN Yonatan Sanders M.D.       • diphenhydrAMINE (BENADRYL) tablet/capsule 25 mg  25 mg Oral Q6HRS PRN Yonatan  SINDHU Sanders M.D.        Or   • diphenhydrAMINE (BENADRYL) injection 25 mg  25 mg Intravenous Q6HRS PRN Yonatan Sanders M.D.       • LR infusion   Intravenous Continuous Yonatan Sanders M.D.   Stopped at 08/05/20 1415   • oxytocin (PITOCIN) infusion (for postpartum)   mL/hr Intravenous Continuous Yonatan Sanders M.D. 125 mL/hr at 08/05/20 1500 125 mL/hr at 08/05/20 1500   • miSOPROStol (CYTOTEC) tablet 800 mcg  800 mcg Rectal Once PRN Yonatan Sanders M.D.       • LR infusion   Intravenous PRN Yonatan Sanders M.D.       • miSOPROStol (CYTOTEC) tablet 600 mcg  600 mcg Rectal Once PRN Yonatan Sanders M.D.       • docusate sodium (COLACE) capsule 100 mg  100 mg Oral BID PRN Yonatan Sanders M.D.   100 mg at 08/06/20 1022   • bisacodyl (DULCOLAX) suppository 10 mg  10 mg Rectal PRN Yonatan Sanders M.D.       • simethicone (MYLICON) chewable tab 80 mg  80 mg Oral 4X/DAY PRN Yonatan Sanders M.D.   80 mg at 08/05/20 2147   • prenatal plus vitamin (STUARTNATAL 1+1) 27-1 MG tablet 1 Tab  1 Tab Oral Daily-0800 Yonatan Sanders M.D.   1 Tab at 08/06/20 0834   • magnesium hydroxide (MILK OF MAGNESIA) suspension 30 mL  30 mL Oral Q6HRS PRN Yonatan Sanders M.D.           A/P S/P Repeat LTCS now PPD/POD #2. Plan to proceed with the discharge home with f/u in 2 weeks. Discharge instructions given. All questions were answered.

## 2020-08-07 NOTE — PROGRESS NOTES
2050 Pt doing well bonding with baby, Assessment done wound open to air clean and dry with steri strip to skin, Pt complained of mild abdominal pain medicated her as per doctor orders, Encourage more ambulation, Needs attended.

## 2020-08-08 VITALS
RESPIRATION RATE: 18 BRPM | TEMPERATURE: 97.4 F | HEART RATE: 87 BPM | DIASTOLIC BLOOD PRESSURE: 78 MMHG | OXYGEN SATURATION: 99 % | WEIGHT: 154 LBS | SYSTOLIC BLOOD PRESSURE: 133 MMHG | BODY MASS INDEX: 28.34 KG/M2 | HEIGHT: 62 IN

## 2020-08-08 PROCEDURE — A9270 NON-COVERED ITEM OR SERVICE: HCPCS | Performed by: SPECIALIST

## 2020-08-08 PROCEDURE — 700112 HCHG RX REV CODE 229: Performed by: SPECIALIST

## 2020-08-08 PROCEDURE — 700102 HCHG RX REV CODE 250 W/ 637 OVERRIDE(OP): Performed by: SPECIALIST

## 2020-08-08 RX ORDER — HYDROCODONE BITARTRATE AND ACETAMINOPHEN 5; 325 MG/1; MG/1
1 TABLET ORAL EVERY 4 HOURS PRN
Qty: 20 TAB | Refills: 0 | Status: SHIPPED | OUTPATIENT
Start: 2020-08-08 | End: 2020-08-15

## 2020-08-08 RX ORDER — IBUPROFEN 600 MG/1
600 TABLET ORAL EVERY 6 HOURS PRN
Qty: 30 TAB | Refills: 0 | Status: SHIPPED | OUTPATIENT
Start: 2020-08-08 | End: 2021-07-19

## 2020-08-08 RX ADMIN — HYDROCODONE BITARTRATE AND ACETAMINOPHEN 1 TABLET: 10; 325 TABLET ORAL at 03:07

## 2020-08-08 RX ADMIN — IBUPROFEN 600 MG: 600 TABLET, FILM COATED ORAL at 03:07

## 2020-08-08 RX ADMIN — PRENATAL WITH FERROUS FUM AND FOLIC ACID 1 TABLET: 3080; 920; 120; 400; 22; 1.84; 3; 20; 10; 1; 12; 200; 27; 25; 2 TABLET ORAL at 08:29

## 2020-08-08 RX ADMIN — DOCUSATE SODIUM 100 MG: 100 CAPSULE, LIQUID FILLED ORAL at 08:29

## 2020-08-08 RX ADMIN — IBUPROFEN 600 MG: 600 TABLET, FILM COATED ORAL at 08:29

## 2020-08-08 RX ADMIN — IBUPROFEN 600 MG: 600 TABLET, FILM COATED ORAL at 15:44

## 2020-08-08 RX ADMIN — HYDROCODONE BITARTRATE AND ACETAMINOPHEN 1 TABLET: 5; 325 TABLET ORAL at 13:08

## 2020-08-08 RX ADMIN — HYDROCODONE BITARTRATE AND ACETAMINOPHEN 1 TABLET: 10; 325 TABLET ORAL at 16:24

## 2020-08-08 RX ADMIN — HYDROCODONE BITARTRATE AND ACETAMINOPHEN 1 TABLET: 5; 325 TABLET ORAL at 08:29

## 2020-08-08 NOTE — CARE PLAN
Problem: Alteration in comfort related to surgical incision and/or after birth pains  Goal: Patient verbalizes acceptable pain level  Outcome: PROGRESSING AS EXPECTED  Note: Patient's pain is being managed with PRN pain medications     Problem: Potential knowledge deficit related to lack of understanding of self and  care  Goal: Patient will demonstrate ability to care for self and infant  Outcome: PROGRESSING AS EXPECTED  Note: Patient able to care for self and infant appropriately

## 2020-08-08 NOTE — DISCHARGE INSTRUCTIONS
POSTPARTUM DISCHARGE INSTRUCTIONS FOR MOM    YOB: 1990   Age: 30 y.o.               Admit Date: 2020     Discharge Date: 2020  Attending Doctor:  Yonatan Sanders M.D.                  Allergies:  Nkda [no known drug allergy]    Discharged to home by car. Discharged via wheelchair, hospital escort: Yes.  Special equipment needed: Not Applicable  Belongings with: Personal  Be sure to schedule a follow-up appointment with your primary care doctor or any specialists as instructed.     Discharge Plan:   Diet Plan: Discussed  Activity Level: Discussed  Confirmed Follow up Appointment: Patient to Call and Schedule Appointment  Confirmed Symptoms Management: Discussed  Medication Reconciliation Updated: Yes    REASONS TO CALL YOUR OBSTETRICIAN:  1.   Persistent fever or shaking chills (Temperature higher than 100.4)  2.   Heavy bleeding (soaking more than 1 pad per hour); Passing clots  3.   Foul odor from vagina  4.   Mastitis (Breast infection; breast pain, chills, fever, redness)  5.   Urinary pain, burning or frequency  6.   Episiotomy infection  7.   Abdominal incision infection  8.   Severe depression longer than 24 hours    HAND WASHING  · Prior to handling the baby.  · Before breastfeeding or bottle feeding baby.  · After using the bathroom or changing the baby's diaper.    WOUND CARE  Ask your physician for additional care instructions.  In general:    ·  Incision:      · Keep clean and dry.    · Do NOT lift anything heavier than your baby for up to 6 weeks.    · There should not be any opening or pus.      VAGINAL CARE  · Nothing inside vagina for 6 weeks: no sexual intercourse, tampons or douching.  · Bleeding may continue for 2-4 weeks.  Amount may vary.    · Call your physician for heavy bleeding which means soaking more than 1 pad per hour    BIRTH CONTROL  · It is possible to become pregnant at any time after delivery and while breastfeeding.  · Plan to discuss a method of birth  "control with your physician at your follow up visit. visit.    DIET AND ELIMINATION  · Eating more fiber (bran cereal, fruits, and vegetables) and drinking plenty of fluids will help to avoid constipation.  · Urinary frequency after childbirth is normal.    POSTPARTUM BLUES  During the first few days after birth, you may experience a sense of the \"blues\" which may include impatience, irritability or even crying.  These feeling come and go quickly.  However, as many as 1 in 10 women experience emotional symptoms known as postpartum depression.    Postpartum depression:  May start as early as the second or third day after delivery or take several weeks or months to develop.  Symptoms of \"blues\" are present, but are more intense:  Crying spells; loss of appetite; feelings of hopelessness or loss of control; fear of touching the baby; over concern or no concern at all about the baby; little or no concern about your own appearance/caring for yourself; and/or inability to sleep or excessive sleeping.  Contact your physician if you are experiencing any of these symptoms.    Crisis Hotline:  · [place] Crisis Hotline:  6-687-NZEBYXM  Or 1-369.953.6116  · [place] Crisis Hotline:  1-274.683.3670  Or 298-048-5928    PREVENTING SHAKEN BABY:  If you are angry or stressed, PUT THE BABY IN THE CRIB, step away, take some deep breaths, and wait until you are calm to care for the baby.  DO NOT SHAKE THE BABY.  You are not alone, call a supporter for help.    · Crisis [facility] Center 24/7 crisis line 475-572-2997 or 1-154.755.7722  · You can also text them, text \"ANSWER\" to 059193    QUIT SMOKING/TOBACCO USE:  I understand the use of any tobacco products increases my chance of suffering from future heart disease and could cause other illnesses which may shorten my life. Quitting the use of tobacco products is the single most important thing I can do to improve my health. For further information on smoking / tobacco cessation call a Toll " Free Quit Line at 1-210.945.8055 (*[employer] [place]) or 1-391.656.5976 (American Lung Association) or you can access the web based program at www.lungusa.org.    · [place] Tobacco Users Help Line:  (201) 511-9959       Toll Free: 1-755.207.9435  · Quit Tobacco Program [facility] (243)275-3332    DEPRESSION / SUICIDE RISK:  As you are discharged from this [facility], it is important to learn how to keep safe from harming yourself.    Recognize the warning signs:  · Abrupt changes in personality, positive or negative- including increase in energy   · Giving away possessions  · Change in eating patterns- significant weight changes-  positive or negative  · Change in sleeping patterns- unable to sleep or sleeping all the time   · Unwillingness or inability to communicate  · Depression  · Unusual sadness, discouragement and loneliness  · Talk of wanting to die  · Neglect of personal appearance   · Rebelliousness- reckless behavior  · Withdrawal from people/activities they love  · Confusion- inability to concentrate     If you or a loved one observes any of these behaviors or has concerns about self-harm, here's what you can do:  · Talk about it- your feelings and reasons for harming yourself  · Remove any means that you might use to hurt yourself (examples: pills, rope, extension cords, firearm)  · Get professional help from the community (Mental Health, Substance Abuse, psychological counseling)  · Do not be alone:Call your Safe Contact- someone whom you trust who will be there for you.  · Call your local CRISIS HOTLINE 493-9237 or 569-442-3342  · Call your local Children's Mobile Crisis Response Team [place] (746) 606-4352 or www.[facility]  · Call the toll free National Suicide Prevention Hotlines   · National Suicide Prevention Lifeline 863-661-PIZI (5867)  · National Hope Line Network 800-SUICIDE (330-9679)    DISCHARGE SURVEY:  Thank you for choosing [facility].  We hope we provided you with very good care.  You may be receiving a survey in the mail.  Please fill it out.  Your opinion is valuable to us.    ADDITIONAL EDUCATIONAL MATERIALS GIVEN TO PATIENT:        My signature on this form indicates that:  1.  I have reviewed and understand the above information  2.  My questions regarding this information have been answered to my satisfaction.  3.  I have formulated a plan with my discharge nurse to obtain my prescribed medication for home.

## 2020-08-08 NOTE — LACTATION NOTE
This note was copied from a baby's chart.  @0969 met with LUIS FELIPE for follow-up lactation consult, MOB states she feels baby is breastfeeding well, she denies pain when she breastfeeds, she declines offer for assistance at this time, she states she has been using breast pump independently because she was concerned that baby was not getting enough milk when just breastfeeding, she states she pumping about 2 ml when using the breast pump, she denies any discomfort when she pumps, she stats she has also been supplementing with formula since baby was weighed last night, baby's weight loss last night was 9.76% (weight loss was 6.51% the previous night), baby has been voiding and stooling adequately, MOB feels her milk is starting to come in, she states she plans to continue pumping and supplementing Q 3 hours after breastfeeding until her milk is in fully, she states she feels baby has been able to rest more between feedings since she began supplementation    MOB has breast pump for home use already, she is aware she can follow-up with her WIC counselor for breastfeeding assistance as needed after discharge, she also has information provided on outpatient assistance available at the breastfeeding medicine center and is aware she can follow-up there as well if needed    Supplement guidelines provided and explained    Encouraged to call for assistance as needed

## 2020-08-08 NOTE — PROGRESS NOTES
Assessment complete. Fundus firm, lochia light. Pain 6/10, medicated per MAR. Discussed POC for the night. All questions answered at this time. Call light within reach. Encouraged patient to call with any further questions or concerns.

## 2020-08-08 NOTE — PROGRESS NOTES
Dr. Sanders wrote medication prescriptions.  both prescriptions - Norco and Motrin handed to the patient with instructions. All questions and concerns answered.

## 2020-08-08 NOTE — CARE PLAN
Problem: Pain Management  Goal: Pain level will decrease to patient's comfort goal  Outcome: PROGRESSING AS EXPECTED  Note: Patient would like to keep PRN pain medications as scheduled.      Problem: Altered physiologic condition related to postoperative  delivery  Goal: Patient physiologically stable as evidenced by normal lochia, palpable uterine involution and vital signs within normal limits  Outcome: PROGRESSING AS EXPECTED  Note: Patient's incision is attached, no new drainage, steri-strips in placed, lochia light. Patient breastfeeding and breast pumping.

## 2020-08-08 NOTE — DISCHARGE SUMMARY
DATE OF ADMISSION:  2020    DATE OF DISCHARGE:  2020    DISCHARGE DIAGNOSIS:  Status post elective repeat low transverse    section.    HISTORY OF PRESENT ILLNESS:  A 30-year-old  3, para 2 at 39-1/7th   weeks' gestation based on a 9-week ultrasound, who elected to proceed forward   with elective repeat low transverse  section.  Risks, benefits and   alternatives have been addressed.  She has asked appropriate questions, signed   the appropriate consents, and wished to proceed forward with admission as   planned.    PAST MEDICAL HISTORY AND PHYSICAL EXAMINATION:  Can be found in dictated   history and physical.    ASSESSMENT AND PLAN:  A 30-year-old  3, para 2 at term with overall   reassuring fetal status, history of 2 previous  sections, now electing   to proceed forward with elective repeat low transverse  section.    HOSPITAL COURSE:  As stated above, the patient did undergo the elective repeat   low transverse  section with an estimated blood loss of 500 mL,    Apgars were 8 and 9 at 1 and 5 minutes respectively.  Her postpartum and   postoperative courses were unremarkable and on postoperative and postpartum   day #2, she was ambulating and voiding well, tolerating a regular diet.  Her   pain was well controlled.  She was felt to be appropriate for discharge.    DISCHARGE PLAN:  To follow up in 2 and 6 weeks.    DISCHARGE INSTRUCTIONS:  She is to call with any increased temperature greater   than 100.4, increasing vaginal bleeding, abdominal pain unrelieved with any   p.o. pain medication.  Call with any other questions or concerns.    DISCHARGE MEDICATIONS:  Motrin and Norco.  Of note, she had been afebrile   throughout her entire postoperative course.  Her incision remained clean, dry,   and intact without any erythema or induration.       ____________________________________     MD MINISTERIO HUI / KYLE    DD:  2020  08:10:49  DT:  08/08/2020 03:03:43    D#:  [id number]  Job#:  [id number]

## 2020-08-08 NOTE — PROGRESS NOTES
Progress Note    Subjective:   Doing well. No issues or concerns. Patient doing well without any complaints. Pain well controlled. No sig bleeding or discharge    Objective Data:  Recent Labs     08/05/20  2240   WBC 19.5*   RBC 3.25*   HEMOGLOBIN 9.1*   HEMATOCRIT 27.9*   MCV 85.8   MCH 28.0   MCHC 32.6*   RDW 40.2   PLATELETCT 193   MPV 10.6           Vitals:    08/06/20 1749 08/07/20 0600 08/07/20 1800 08/08/20 0600   BP: 132/82 117/74 134/78 133/78   Pulse: 82 62 71 87   Resp: 18 16 18 18   Temp: 36.7 °C (98 °F) 36.3 °C (97.3 °F) 36.8 °C (98.2 °F) 36.3 °C (97.4 °F)   TempSrc: Temporal Temporal Temporal Temporal   SpO2:  95% 97% 99%   Weight:       Height:         Abdomen: soft non tender fundus  Perineum: no sig bleeding or discharge  Ext:non tender calves    No intake or output data in the 24 hours ending 08/08/20 1140    Current Facility-Administered Medications   Medication Dose Route Frequency Provider Last Rate Last Dose   • ibuprofen (MOTRIN) tablet 600 mg  600 mg Oral Q6HRS PRN Yonatan Sanders M.D.   600 mg at 08/08/20 0829   • acetaminophen (TYLENOL) tablet 325 mg  325 mg Oral Q4HRS PRN Yonatan Sanders M.D.       • HYDROcodone-acetaminophen (NORCO) 5-325 MG per tablet 1 Tab  1 Tab Oral Q4HRS PRJUAN RAMON Sanders M.D.   1 Tab at 08/08/20 0829   • HYDROcodone/acetaminophen (NORCO)  MG per tablet 1 Tab  1 Tab Oral Q4HRS PRN Yonatan Sanders M.D.   1 Tab at 08/08/20 0307   • morphine (pf) 4 MG/ML injection 4 mg  4 mg Intramuscular Q3HRS PRJUAN RAMON Sanders M.D.       • ondansetron (ZOFRAN) syringe/vial injection 4 mg  4 mg Intravenous Q6HRS PRN Yonatan Sanders M.D.        Or   • ondansetron (ZOFRAN ODT) dispertab 4 mg  4 mg Oral Q6HRS PRN Yonatan Sanders M.D.       • metoclopramide (REGLAN) injection 10 mg  10 mg Intravenous Q6HRS PRN Yonatan Sanders M.D.       • diphenhydrAMINE (BENADRYL) tablet/capsule 25 mg  25 mg Oral Q6HRS PRN Yonatan Sanders M.D.        Or   • diphenhydrAMINE (BENADRYL)  injection 25 mg  25 mg Intravenous Q6HRS PRN Yonatan Sanders M.D.       • LR infusion   Intravenous Continuous Yonatan Sanders M.D.   Stopped at 08/05/20 1415   • oxytocin (PITOCIN) infusion (for postpartum)   mL/hr Intravenous Continuous Yonatan Sanders M.D. 125 mL/hr at 08/05/20 1500 125 mL/hr at 08/05/20 1500   • miSOPROStol (CYTOTEC) tablet 800 mcg  800 mcg Rectal Once PRN Yonatan Sanders M.D.       • LR infusion   Intravenous PRN Yonatan Sanders M.D.       • miSOPROStol (CYTOTEC) tablet 600 mcg  600 mcg Rectal Once PRN Yonatan Sanders M.D.       • docusate sodium (COLACE) capsule 100 mg  100 mg Oral BID PRN Yonatan Sanders M.D.   100 mg at 08/08/20 0829   • bisacodyl (DULCOLAX) suppository 10 mg  10 mg Rectal PRN Yonatan Sanders M.D.       • simethicone (MYLICON) chewable tab 80 mg  80 mg Oral 4X/DAY PRN Yonatan Sanders M.D.   80 mg at 08/05/20 2147   • prenatal plus vitamin (STUARTNATAL 1+1) 27-1 MG tablet 1 Tab  1 Tab Oral Daily-0800 Yonatan Sanders M.D.   1 Tab at 08/08/20 0829   • magnesium hydroxide (MILK OF MAGNESIA) suspension 30 mL  30 mL Oral Q6HRS PRN Yonatan Sanders M.D.           A/P S/P Repeat LTCS now POD #3. Plan to discharge the patient home with f/u in 2 weeks. Discharge instructions given. All questions were answered

## 2020-08-08 NOTE — LACTATION NOTE
Follow-up visit. RN did weight check and is now 9.76%. Encouraged mother to start supplementing. Plan to keep attempting to latch q3hrs, pump and supplement with formula afterwards. Mother states she feels her milk is coming in, but is not sore.    Reviewed 10-20-30 supplementation guidelines with parents, and they verbalized understanding.     Encouraged mother to call if she needs additional lactation support.

## 2020-09-16 ENCOUNTER — HOSPITAL ENCOUNTER (OUTPATIENT)
Facility: MEDICAL CENTER | Age: 30
End: 2020-09-16
Attending: SPECIALIST
Payer: MEDICAID

## 2020-09-16 PROCEDURE — 87591 N.GONORRHOEAE DNA AMP PROB: CPT

## 2020-09-16 PROCEDURE — 88175 CYTOPATH C/V AUTO FLUID REDO: CPT

## 2020-09-16 PROCEDURE — 87624 HPV HI-RISK TYP POOLED RSLT: CPT

## 2020-09-16 PROCEDURE — 87491 CHLMYD TRACH DNA AMP PROBE: CPT

## 2021-07-19 ENCOUNTER — OFFICE VISIT (OUTPATIENT)
Dept: URGENT CARE | Facility: CLINIC | Age: 31
End: 2021-07-19
Payer: MEDICAID

## 2021-07-19 VITALS
BODY MASS INDEX: 25.95 KG/M2 | SYSTOLIC BLOOD PRESSURE: 106 MMHG | RESPIRATION RATE: 14 BRPM | HEART RATE: 103 BPM | WEIGHT: 141 LBS | TEMPERATURE: 98.5 F | DIASTOLIC BLOOD PRESSURE: 68 MMHG | HEIGHT: 62 IN | OXYGEN SATURATION: 96 %

## 2021-07-19 DIAGNOSIS — Z98.890 HISTORY OF BILATERAL TYMPANOPLASTY: ICD-10-CM

## 2021-07-19 DIAGNOSIS — H66.002 ACUTE SUPPURATIVE OTITIS MEDIA OF LEFT EAR WITHOUT SPONTANEOUS RUPTURE OF TYMPANIC MEMBRANE, RECURRENCE NOT SPECIFIED: ICD-10-CM

## 2021-07-19 DIAGNOSIS — H92.09 OTALGIA, UNSPECIFIED LATERALITY: ICD-10-CM

## 2021-07-19 PROCEDURE — 99214 OFFICE O/P EST MOD 30 MIN: CPT | Performed by: NURSE PRACTITIONER

## 2021-07-19 RX ORDER — MEDROXYPROGESTERONE ACETATE 150 MG/ML
INJECTION, SUSPENSION INTRAMUSCULAR
COMMUNITY
Start: 2021-05-21 | End: 2022-11-30

## 2021-07-19 RX ORDER — AMOXICILLIN 500 MG/1
500 CAPSULE ORAL 2 TIMES DAILY
Qty: 20 CAPSULE | Refills: 0 | Status: SHIPPED | OUTPATIENT
Start: 2021-07-19 | End: 2021-07-29

## 2021-07-19 ASSESSMENT — ENCOUNTER SYMPTOMS
NAUSEA: 0
TINGLING: 0
MYALGIAS: 0
HEMOPTYSIS: 0
COUGH: 0
EYE REDNESS: 0
SORE THROAT: 0
VOMITING: 0
CHILLS: 1
PALPITATIONS: 0
HEADACHES: 0
EYE DISCHARGE: 0
DIZZINESS: 0
SINUS PAIN: 0
FEVER: 0

## 2021-07-19 ASSESSMENT — LIFESTYLE VARIABLES: SUBSTANCE_ABUSE: 0

## 2021-07-19 NOTE — PROGRESS NOTES
"Rachel Green is a 31 y.o. female who presents for Ear Pain ((L) ear pain/pressure/discharge x 2 weeks)      HPI is a new problem.  Diana is a 31-year-old female complains of left ear pain and pressure.  She has had drainage for greater than 2 weeks.  She has a history of tympanoplasty tubes.  She has not seen her ENT in over 3 years and does not know if the tubes are even still in place.  She started developed chills about 48 hours ago.  The drainage is thick yellow \"snot\".  She is having pain down the left side of her  neck.  Treatments tried Tylenol, ibuprofen.  She had mild relief of her symptoms.  No other aggravating or alleviating factors.    Review of Systems   Constitutional: Positive for chills. Negative for fever.   HENT: Positive for ear discharge and ear pain. Negative for hearing loss, sinus pain, sore throat and tinnitus.    Eyes: Negative for discharge and redness.   Respiratory: Negative for cough and hemoptysis.    Cardiovascular: Negative for chest pain and palpitations.   Gastrointestinal: Negative for nausea and vomiting.   Musculoskeletal: Negative for myalgias.   Neurological: Negative for dizziness, tingling and headaches.   Endo/Heme/Allergies: Negative for environmental allergies.   Psychiatric/Behavioral: Negative for substance abuse.       Allergies:       Allergies   Allergen Reactions   • Nkda [No Known Drug Allergy]        PMSFS Hx:  Past Medical History:   Diagnosis Date   • Blood transfusion without reported diagnosis      Past Surgical History:   Procedure Laterality Date   • REPEAT C SECTION  2020    Procedure:  SECTION, REPEAT;  Surgeon: Yonatan Sanders M.D.;  Location: LABOR AND DELIVERY;  Service: Labor and Delivery   • REPEAT C SECTION Bilateral 2018    Procedure: REPEAT C SECTION;  Surgeon: Clemente Adame M.D.;  Location: LABOR AND DELIVERY;  Service: Labor and Delivery   • TONSILLECTOMY  08    Performed by TITUS MOORE at [facility]   • PRIMARY C SECTION  2007   • GYN SURGERY         • OTHER      tubes in the ears at age 22     Family History   Problem Relation Age of Onset   • Diabetes Mother    • Cancer Mother    • Diabetes Father      Social History     Tobacco Use   • Smoking status: Former Smoker     Years: 3.00     Types: Cigarettes     Quit date: 2018     Years since quitting: 3.5   • Smokeless tobacco: Never Used   • Tobacco comment: Quit on 2017   Substance Use Topics   • Alcohol use: Not Currently       Problems:   Patient Active Problem List   Diagnosis   • Encounter for supervision of high risk pregnancy in second trimester, antepartum   • History of  section, desires repeat   • Rubella equivocal status, antepartum - needs PP vaccine   • GBS bacteriuria       Medications:   No current outpatient medications on file prior to visit.     No current facility-administered medications on file prior to visit.          Objective:     /68 (BP Location: Right arm, Patient Position: Sitting, BP Cuff Size: Adult long)   Pulse (!) 103   Temp 36.9 °C (98.5 °F) (Temporal)   Resp 14   Ht 1.575 m (5' 2\") Comment: pt stated  Wt 64 kg (141 lb)   SpO2 96%   Breastfeeding No   BMI 25.79 kg/m²     Physical Exam  Vitals and nursing note reviewed.   Constitutional:       Appearance: She is well-developed.   HENT:      Head: Normocephalic.      Right Ear: Hearing, ear canal and external ear normal. No middle ear effusion. A PE tube (positioned laterally in TM and no longer appears to be draining) is present. Tympanic membrane is injected and erythematous.      Left Ear: Hearing and external ear normal. Drainage (copious thick purulent ), swelling and tenderness present. A middle ear effusion is present. No PE tube (unable to visualize due to swelling and erythema. ).      Mouth/Throat:      Mouth: Mucous membranes are moist.   Eyes:      Conjunctiva/sclera: Conjunctivae normal.   Cardiovascular: "      Rate and Rhythm: Normal rate and regular rhythm.   Pulmonary:      Effort: Pulmonary effort is normal. No respiratory distress.      Breath sounds: Normal breath sounds.   Musculoskeletal:         General: Normal range of motion.      Cervical back: Normal range of motion and neck supple.   Lymphadenopathy:      Head:      Right side of head: No tonsillar adenopathy.      Left side of head: No tonsillar adenopathy.      Cervical: Cervical adenopathy present.      Right cervical: No superficial cervical adenopathy.     Left cervical: Superficial cervical adenopathy present.      Upper Body:      Right upper body: No supraclavicular adenopathy.      Left upper body: No supraclavicular adenopathy.   Skin:     General: Skin is warm and dry.      Capillary Refill: Capillary refill takes less than 2 seconds.   Neurological:      Mental Status: She is alert and oriented to person, place, and time.      Deep Tendon Reflexes: Reflexes are normal and symmetric.   Psychiatric:         Mood and Affect: Mood normal.         Speech: Speech normal.         Behavior: Behavior normal.         Thought Content: Thought content normal.         Assessment /Associated Orders:      1. Otalgia, unspecified laterality  REFERRAL TO ENT   2. Acute suppurative otitis media of left ear without spontaneous rupture of tympanic membrane, recurrence not specified  amoxicillin (AMOXIL) 500 MG Cap    REFERRAL TO ENT   3. History of bilateral tympanoplasty  REFERRAL TO ENT       Medical Decision Making:    Pt is clinically stable at today's acute urgent care visit.  No acute distress noted. Appropriate for outpatient management at this time.   Acute problem today with uncertain prognosis.     OTC  analgesic of choice (acetaminophen or NSAID). Follow manufactures dosing and safety precautions.   Warm packs prn pain   Referral to ENT for FV - has not seen ENT for > 3 years.   OTC antihistamine of choice. Follow manufactures dosing and safety  guidelines.       Advised to follow-up with the primary care provider for recheck, reevaluation, and consideration of further management if necessary.   Discussed management options (risks,benefits, and alternatives to treatment). Expressed understanding and the treatment plan was agreed upon. Questions were encouraged and answered       Return to urgent care prn if new or worsening sx or if there is no improvement in condition prn.  Educated in Red flags and indications to immediately call 911 or present to the Emergency Department.     I personally reviewed prior external notes and test results pertinent to today's visit.  I have independently reviewed and interpreted all diagnostics ordered during this urgent care acute visit.   Time spent evaluating this patient was at least 30 minutes and includes preparing for visit, counseling/education, exam and evaluation, obtaining history, independent interpretation, ordering lab/test/procedures,medication management and documentation.Time does not include separately billable procedures noted .

## 2022-01-20 ENCOUNTER — TELEPHONE (OUTPATIENT)
Dept: SCHEDULING | Facility: IMAGING CENTER | Age: 32
End: 2022-01-20

## 2022-02-09 ENCOUNTER — OFFICE VISIT (OUTPATIENT)
Dept: MEDICAL GROUP | Facility: MEDICAL CENTER | Age: 32
End: 2022-02-09
Attending: NURSE PRACTITIONER
Payer: MEDICAID

## 2022-02-09 VITALS
DIASTOLIC BLOOD PRESSURE: 74 MMHG | RESPIRATION RATE: 16 BRPM | SYSTOLIC BLOOD PRESSURE: 108 MMHG | OXYGEN SATURATION: 99 % | TEMPERATURE: 97.4 F | BODY MASS INDEX: 27.36 KG/M2 | HEART RATE: 75 BPM | HEIGHT: 62 IN | WEIGHT: 148.7 LBS

## 2022-02-09 DIAGNOSIS — Z13.29 SCREENING FOR ENDOCRINE, NUTRITIONAL, METABOLIC AND IMMUNITY DISORDER: ICD-10-CM

## 2022-02-09 DIAGNOSIS — Z13.21 SCREENING FOR ENDOCRINE, NUTRITIONAL, METABOLIC AND IMMUNITY DISORDER: ICD-10-CM

## 2022-02-09 DIAGNOSIS — Z13.228 SCREENING FOR ENDOCRINE, NUTRITIONAL, METABOLIC AND IMMUNITY DISORDER: ICD-10-CM

## 2022-02-09 DIAGNOSIS — Z76.89 ENCOUNTER TO ESTABLISH CARE: ICD-10-CM

## 2022-02-09 DIAGNOSIS — T78.40XA ALLERGIC REACTION, INITIAL ENCOUNTER: ICD-10-CM

## 2022-02-09 DIAGNOSIS — Z13.0 SCREENING FOR ENDOCRINE, NUTRITIONAL, METABOLIC AND IMMUNITY DISORDER: ICD-10-CM

## 2022-02-09 DIAGNOSIS — Z23 NEED FOR VACCINATION: ICD-10-CM

## 2022-02-09 PROCEDURE — 90471 IMMUNIZATION ADMIN: CPT

## 2022-02-09 PROCEDURE — 99203 OFFICE O/P NEW LOW 30 MIN: CPT | Performed by: NURSE PRACTITIONER

## 2022-02-09 PROCEDURE — 99214 OFFICE O/P EST MOD 30 MIN: CPT | Performed by: NURSE PRACTITIONER

## 2022-02-09 PROCEDURE — 99213 OFFICE O/P EST LOW 20 MIN: CPT | Mod: 25 | Performed by: NURSE PRACTITIONER

## 2022-02-09 ASSESSMENT — PATIENT HEALTH QUESTIONNAIRE - PHQ9: CLINICAL INTERPRETATION OF PHQ2 SCORE: 0

## 2022-02-09 NOTE — ASSESSMENT & PLAN NOTE
Unknown diagnosis-  Referral placed to allergy testing  Encourage patient to continue to try and avoid known triggers  Encouraged Benadryl use with onset of hives  Patient denied any shortness of breath, or swelling with reactions so we will hold off on EpiPen prescription at this time, however low threshold to prescribe if allergies progress.  Patient knows to present to UC or ER if she develops severe reaction

## 2022-02-09 NOTE — PROGRESS NOTES
Chief Complaint   Patient presents with   • Establish Care       Subjective:     HPI:   Rachel Green is a 31 y.o. female here to discuss the evaluation and management of:        Problem   Encounter to Establish Care    Patient here to establish care.  Has not had primary in a couple years but regularly follows with her OB/GYN, Dr. Sanders.  Currently receives Depo-Provera through his office as her birth control method.  Would like to discuss recent allergic reaction she seems to be having and referral for allergy testing.     Allergic Reaction    Patient states over the last year she has noticed that she is starting to have reactions which she initially thought was due to her moisturizer cleansing products on her face.  These reactions seem to be intensifying and occurring more frequently.  Patient is now concerned that it may be related to eggs, or peanuts.  Patient states she was trying to cook cupcakes and touched the batter with raw egg in it and developed hives and itching.  The same occurred when she was making peanut butter and jelly sandwiches for her children and touched the peanut butter with her skin.  Patient states she also ate some general chicken that caused her to break out in hives all over and left red bumps on her face afterwards that are slowly healing.  Patient states she takes allergy medication, Allegra when needed.  She has been trying to avoid triggers but would like to know what she is allergic to so she can avoid better.         ROS  See HPI       Allergies   Allergen Reactions   • Nkda [No Known Drug Allergy]        Current medicines (including changes today)  Current Outpatient Medications   Medication Sig Dispense Refill   • medroxyPROGESTERone 150 MG/ML Suspension Prefilled Syringe PRE LOADED SYRINGE FOR INJECTION IN OFFICE       No current facility-administered medications for this visit.       Social History     Tobacco Use   • Smoking status: Former Smoker     Years: 3.00  "    Types: Cigarettes     Quit date: 2018     Years since quittin.1   • Smokeless tobacco: Never Used   • Tobacco comment: Quit on 2017   Vaping Use   • Vaping Use: Every day   • Substances: THC, CBD, Flavoring   • Devices: Disposable   Substance Use Topics   • Alcohol use: Yes     Comment: 1-2 mo.   • Drug use: Yes     Types: Marijuana, Inhaled       Patient Active Problem List    Diagnosis Date Noted   • Encounter to establish care 2022   • Allergic reaction 2022   • GBS bacteriuria 2017   • Rubella equivocal status, antepartum - needs PP vaccine 2017   • Encounter for supervision of high risk pregnancy in second trimester, antepartum 2017   • History of  section, desires repeat 2017       Family History   Problem Relation Age of Onset   • Diabetes Mother    • Hypertension Mother    • Heart Disease Mother    • Diabetes Father    • Cancer Father         colon polyps   • Hypertension Father    • Heart Disease Maternal Grandmother    • Heart Disease Maternal Grandfather           Objective:     /74 (BP Location: Left arm, Patient Position: Sitting, BP Cuff Size: Adult)   Pulse 75   Temp 36.3 °C (97.4 °F) (Rectal)   Resp 16   Ht 1.575 m (5' 2\")   Wt 67.4 kg (148 lb 11.2 oz)   SpO2 99%  Body mass index is 27.2 kg/m².    Physical Exam:  Physical Exam  Vitals reviewed.   Constitutional:       General: She is awake.      Appearance: Normal appearance. She is well-developed.   HENT:      Head: Normocephalic.      Nose: Nose normal.      Mouth/Throat:      Mouth: Mucous membranes are moist.      Pharynx: Oropharynx is clear. No oropharyngeal exudate.   Eyes:      Conjunctiva/sclera: Conjunctivae normal.      Pupils: Pupils are equal, round, and reactive to light.   Cardiovascular:      Rate and Rhythm: Normal rate and regular rhythm.      Heart sounds: Normal heart sounds.   Pulmonary:      Effort: Pulmonary effort is normal. No respiratory distress.      " Breath sounds: Normal breath sounds. No wheezing.   Musculoskeletal:      Cervical back: Neck supple. No tenderness.   Lymphadenopathy:      Cervical: No cervical adenopathy.   Skin:     General: Skin is warm and dry.      Comments: Noted healing rash to face    Neurological:      Mental Status: She is alert and oriented to person, place, and time.   Psychiatric:         Mood and Affect: Mood normal.         Behavior: Behavior normal. Behavior is cooperative.         Assessment and Plan:     The following treatment plan was discussed:    Problem List Items Addressed This Visit     Encounter to establish care     Discussed health history and maintenance   Flu vaccine - Provided  Colon Ca screening - Not applicable   Mammogram- Not Applicable   Pap smear - Done within last 3 years   STD Screening- Declined   Preventative screening labs ordered -we will follow up in 4 weeks           Allergic reaction     Unknown diagnosis-  Referral placed to allergy testing  Encourage patient to continue to try and avoid known triggers  Encouraged Benadryl use with onset of hives  Patient denied any shortness of breath, or swelling with reactions so we will hold off on EpiPen prescription at this time, however low threshold to prescribe if allergies progress.  Patient knows to present to UC or ER if she develops severe reaction           Relevant Orders    Referral to Allergy      Other Visit Diagnoses     Screening for endocrine, nutritional, metabolic and immunity disorder        Relevant Orders    HEMOGLOBIN A1C    Comp Metabolic Panel    FREE THYROXINE    TSH    VITAMIN D,25 HYDROXY    Lipid Profile    CBC WITH DIFFERENTIAL    HEP C VIRUS ANTIBODY    Need for vaccination        Relevant Orders    INFLUENZA VACCINE QUAD INJ (PF)          Any change or worsening of signs or symptoms, patient encouraged to follow-up or report to emergency room for further evaluation. Patient verbalizes understanding and agrees.    Follow-Up: Return  in about 4 weeks (around 3/9/2022) for Follow up Labs.      PLEASE NOTE: This dictation was created using voice recognition software. I have made every reasonable attempt to correct obvious errors, but I expect that there are errors of grammar and possibly content that I did not discover before finalizing the note.

## 2022-02-09 NOTE — ASSESSMENT & PLAN NOTE
Discussed health history and maintenance   Flu vaccine - Provided  Colon Ca screening - Not applicable   Mammogram- Not Applicable   Pap smear - Done within last 3 years   STD Screening- Declined   Preventative screening labs ordered -we will follow up in 4 weeks

## 2022-02-11 ENCOUNTER — HOSPITAL ENCOUNTER (OUTPATIENT)
Facility: MEDICAL CENTER | Age: 32
End: 2022-02-11
Attending: SPECIALIST
Payer: COMMERCIAL

## 2022-02-11 PROCEDURE — 87591 N.GONORRHOEAE DNA AMP PROB: CPT

## 2022-02-11 PROCEDURE — 87624 HPV HI-RISK TYP POOLED RSLT: CPT

## 2022-02-11 PROCEDURE — 87491 CHLMYD TRACH DNA AMP PROBE: CPT

## 2022-02-11 PROCEDURE — 88175 CYTOPATH C/V AUTO FLUID REDO: CPT

## 2022-03-08 ENCOUNTER — HOSPITAL ENCOUNTER (OUTPATIENT)
Dept: LAB | Facility: MEDICAL CENTER | Age: 32
End: 2022-03-08
Attending: NURSE PRACTITIONER
Payer: MEDICAID

## 2022-03-08 DIAGNOSIS — Z13.228 SCREENING FOR ENDOCRINE, NUTRITIONAL, METABOLIC AND IMMUNITY DISORDER: ICD-10-CM

## 2022-03-08 DIAGNOSIS — Z13.29 SCREENING FOR ENDOCRINE, NUTRITIONAL, METABOLIC AND IMMUNITY DISORDER: ICD-10-CM

## 2022-03-08 DIAGNOSIS — Z13.0 SCREENING FOR ENDOCRINE, NUTRITIONAL, METABOLIC AND IMMUNITY DISORDER: ICD-10-CM

## 2022-03-08 DIAGNOSIS — Z13.21 SCREENING FOR ENDOCRINE, NUTRITIONAL, METABOLIC AND IMMUNITY DISORDER: ICD-10-CM

## 2022-03-08 LAB
25(OH)D3 SERPL-MCNC: 15 NG/ML (ref 30–100)
ALBUMIN SERPL BCP-MCNC: 5.1 G/DL (ref 3.2–4.9)
ALBUMIN/GLOB SERPL: 2.1 G/DL
ALP SERPL-CCNC: 57 U/L (ref 30–99)
ALT SERPL-CCNC: 11 U/L (ref 2–50)
ANION GAP SERPL CALC-SCNC: 14 MMOL/L (ref 7–16)
AST SERPL-CCNC: 13 U/L (ref 12–45)
BASOPHILS # BLD AUTO: 0.6 % (ref 0–1.8)
BASOPHILS # BLD: 0.06 K/UL (ref 0–0.12)
BILIRUB SERPL-MCNC: 0.4 MG/DL (ref 0.1–1.5)
BUN SERPL-MCNC: 11 MG/DL (ref 8–22)
CALCIUM SERPL-MCNC: 9.4 MG/DL (ref 8.5–10.5)
CHLORIDE SERPL-SCNC: 104 MMOL/L (ref 96–112)
CHOLEST SERPL-MCNC: 139 MG/DL (ref 100–199)
CO2 SERPL-SCNC: 22 MMOL/L (ref 20–33)
CREAT SERPL-MCNC: 0.57 MG/DL (ref 0.5–1.4)
EOSINOPHIL # BLD AUTO: 0.04 K/UL (ref 0–0.51)
EOSINOPHIL NFR BLD: 0.4 % (ref 0–6.9)
ERYTHROCYTE [DISTWIDTH] IN BLOOD BY AUTOMATED COUNT: 41.7 FL (ref 35.9–50)
FASTING STATUS PATIENT QL REPORTED: NORMAL
GLOBULIN SER CALC-MCNC: 2.4 G/DL (ref 1.9–3.5)
GLUCOSE SERPL-MCNC: 82 MG/DL (ref 65–99)
HCT VFR BLD AUTO: 43.3 % (ref 37–47)
HCV AB SER QL: NORMAL
HDLC SERPL-MCNC: 35 MG/DL
HGB BLD-MCNC: 14.6 G/DL (ref 12–16)
IMM GRANULOCYTES # BLD AUTO: 0.04 K/UL (ref 0–0.11)
IMM GRANULOCYTES NFR BLD AUTO: 0.4 % (ref 0–0.9)
LDLC SERPL CALC-MCNC: 92 MG/DL
LYMPHOCYTES # BLD AUTO: 2.45 K/UL (ref 1–4.8)
LYMPHOCYTES NFR BLD: 23.8 % (ref 22–41)
MCH RBC QN AUTO: 30.7 PG (ref 27–33)
MCHC RBC AUTO-ENTMCNC: 33.7 G/DL (ref 33.6–35)
MCV RBC AUTO: 91 FL (ref 81.4–97.8)
MONOCYTES # BLD AUTO: 0.52 K/UL (ref 0–0.85)
MONOCYTES NFR BLD AUTO: 5 % (ref 0–13.4)
NEUTROPHILS # BLD AUTO: 7.2 K/UL (ref 2–7.15)
NEUTROPHILS NFR BLD: 69.8 % (ref 44–72)
NRBC # BLD AUTO: 0 K/UL
NRBC BLD-RTO: 0 /100 WBC
PLATELET # BLD AUTO: 252 K/UL (ref 164–446)
PMV BLD AUTO: 10.4 FL (ref 9–12.9)
POTASSIUM SERPL-SCNC: 4 MMOL/L (ref 3.6–5.5)
PROT SERPL-MCNC: 7.5 G/DL (ref 6–8.2)
RBC # BLD AUTO: 4.76 M/UL (ref 4.2–5.4)
SODIUM SERPL-SCNC: 140 MMOL/L (ref 135–145)
T4 FREE SERPL-MCNC: 1.22 NG/DL (ref 0.93–1.7)
TRIGL SERPL-MCNC: 60 MG/DL (ref 0–149)
TSH SERPL DL<=0.005 MIU/L-ACNC: 0.49 UIU/ML (ref 0.38–5.33)
WBC # BLD AUTO: 10.3 K/UL (ref 4.8–10.8)

## 2022-03-08 PROCEDURE — 84439 ASSAY OF FREE THYROXINE: CPT

## 2022-03-08 PROCEDURE — 80053 COMPREHEN METABOLIC PANEL: CPT

## 2022-03-08 PROCEDURE — 82785 ASSAY OF IGE: CPT

## 2022-03-08 PROCEDURE — 86803 HEPATITIS C AB TEST: CPT

## 2022-03-08 PROCEDURE — 36415 COLL VENOUS BLD VENIPUNCTURE: CPT

## 2022-03-08 PROCEDURE — 85025 COMPLETE CBC W/AUTO DIFF WBC: CPT

## 2022-03-08 PROCEDURE — 86003 ALLG SPEC IGE CRUDE XTRC EA: CPT | Mod: 91

## 2022-03-08 PROCEDURE — 80061 LIPID PANEL: CPT

## 2022-03-08 PROCEDURE — 83520 IMMUNOASSAY QUANT NOS NONAB: CPT

## 2022-03-08 PROCEDURE — 82306 VITAMIN D 25 HYDROXY: CPT

## 2022-03-08 PROCEDURE — 86008 ALLG SPEC IGE RECOMB EA: CPT | Mod: 91,XU

## 2022-03-08 PROCEDURE — 83036 HEMOGLOBIN GLYCOSYLATED A1C: CPT

## 2022-03-08 PROCEDURE — 84443 ASSAY THYROID STIM HORMONE: CPT

## 2022-03-09 LAB
EST. AVERAGE GLUCOSE BLD GHB EST-MCNC: 100 MG/DL
HBA1C MFR BLD: 5.1 % (ref 4–5.6)

## 2022-03-12 LAB — TRYPTASE SERPL-MCNC: 3.2 UG/L

## 2022-03-15 LAB
ALLERGEN, ARA H 6 SEVERE PEANUT Q0585: <0.1 KU/L
ALMOND IGE QN: <0.1 KU/L
ANNOTATION COMMENT IMP: NORMAL
AVOCADO IGE QN: <0.1 KU/L
BANANA IGE QN: <0.1 KU/L
CELERY IGE QN: <0.1 KU/L
CHESTNUT IGE QN: <0.1 KU/L
COCONUT IGE QN: <0.1 KU/L
COW MILK IGE QN: 0.18 KU/L
DEPRECATED MISC ALLERGEN IGE RAST QL: NORMAL
EGG WHITE IGE QN: 0.17 KU/L
GRAPE IGE QN: <0.1 KU/L
IGE SERPL-ACNC: 45 KU/L
KIWIFRUIT IGE QN: <0.1 KU/L
OAT IGE QN: <0.1 KU/L
OVALB IGE QN: 0.13 KU/L
PAPAYA IGE QN: <0.1 KU/L
PATHOLOGY STUDY: NORMAL
PEANUT (RARA H) 1 IGE QN: <0.1 KU/L
PEANUT (RARA H) 2 IGE QN: <0.1 KU/L
PEANUT (RARA H) 3 IGE QN: <0.1 KU/L
PEANUT (RARA H) 8 IGE QN: <0.1 KU/L
PEANUT (RARA H) 9 IGE QN: <0.1 KU/L
PEANUT IGE QN: <0.1 KU/L
PECAN/HICK NUT IGE QN: <0.1 KU/L
POTATO IGE QN: <0.1 KU/L
SESAME SEED IGE QN: <0.1 KU/L
SOYBEAN IGE QN: <0.1 KU/L
TOMATO IGE QN: <0.1 KU/L
WATERMELON IGE QN: <0.1 KU/L
WHEAT IGE QN: <0.1 KU/L

## 2022-11-02 NOTE — PROGRESS NOTES
Chief Complaint  Anxiety (MICHELLE DATE OF SERVICE:  2018    DATE SEEN:  Thursday, 2018    The patient is a very pleasant 28-year-old primipara (para 1) with 1 previous    section, which was 11 years ago and she is pregnant and has had   prenatal care with Dr. Sanedrs during the course of this pregnancy and she is   today at 34 weeks and 6 days gestation.  She presents to labor and delivery   with complaints of pelvic pain and pelvic pressure and possible contractions.    She says she feels fetal movement.  Here in the triage area of labor and   delivery, the fetal heart tracing was found to be reactive and I checked her   cervix and found her cervix to be long and closed and high.  It does not   appear when reviewing the fetal monitor tracing that she is having   contractions.  I explained to the patient that she is not in labor and that   her cervix is long and closed and high.  We are discharging her home with   instructions.  I instructed her to follow up with Dr. Sanders for her   previously scheduled appointment and asked her to call or contact myself or   Dr. Sanders at any time should she ever have any problems or questions or   complaints and she said that she would do so.       ____________________________________     NICK OROZCO MD    MED / [facility]    DD:  2018 20:22:43  DT:  2018 20:59:47    D#:  [id number]  Job#:  [id number]    cc: EFRAÍN SANDERS MD   "Sommer Smyth presents to Great River 

## 2022-11-30 ENCOUNTER — OFFICE VISIT (OUTPATIENT)
Dept: MEDICAL GROUP | Facility: MEDICAL CENTER | Age: 32
End: 2022-11-30
Attending: NURSE PRACTITIONER
Payer: MEDICAID

## 2022-11-30 VITALS
TEMPERATURE: 97 F | RESPIRATION RATE: 19 BRPM | BODY MASS INDEX: 28.25 KG/M2 | WEIGHT: 153.5 LBS | HEART RATE: 79 BPM | HEIGHT: 62 IN | SYSTOLIC BLOOD PRESSURE: 106 MMHG | OXYGEN SATURATION: 97 % | DIASTOLIC BLOOD PRESSURE: 68 MMHG

## 2022-11-30 DIAGNOSIS — Z97.5 IUD (INTRAUTERINE DEVICE) IN PLACE: ICD-10-CM

## 2022-11-30 DIAGNOSIS — Z23 NEED FOR VACCINATION: ICD-10-CM

## 2022-11-30 DIAGNOSIS — H91.93 BILATERAL HEARING LOSS, UNSPECIFIED HEARING LOSS TYPE: ICD-10-CM

## 2022-11-30 PROCEDURE — 90471 IMMUNIZATION ADMIN: CPT

## 2022-11-30 PROCEDURE — 99214 OFFICE O/P EST MOD 30 MIN: CPT | Performed by: NURSE PRACTITIONER

## 2022-11-30 PROCEDURE — 99212 OFFICE O/P EST SF 10 MIN: CPT | Performed by: NURSE PRACTITIONER

## 2022-11-30 ASSESSMENT — FIBROSIS 4 INDEX: FIB4 SCORE: 0.5

## 2022-12-02 PROBLEM — Z97.5 IUD (INTRAUTERINE DEVICE) IN PLACE: Status: ACTIVE | Noted: 2022-12-02

## 2022-12-02 PROBLEM — H91.93 BILATERAL HEARING LOSS: Status: ACTIVE | Noted: 2022-12-02

## 2022-12-02 NOTE — PROGRESS NOTES
No chief complaint on file.      Subjective:     HPI:   Rachel Green is a 32 y.o. female here to discuss the evaluation and management of:      Problem   IUD (Intrauterine Device) in Place    Patient had recent placement of IUD from Dr. Sanders. States she was supposed to follow up in his office to ensure placement was ok and now can not get into the office.       Bilateral Hearing Loss    Patient has long standing history of issues with her ears. Has had tubes placed several times starting at the age of 4. Was told she would need to follow with ENT for life but has not had a chance to go for several years secondary to putting things off to take care of her children. She is starting to notice decrease hearing and is being told by others that she needs to have her ears checked.          ROS  See HPI     Allergies   Allergen Reactions    Nkda [No Known Drug Allergy]        Current medicines (including changes today)  No current outpatient medications on file.     No current facility-administered medications for this visit.       Social History     Tobacco Use    Smoking status: Former     Years: 3.00     Types: Cigarettes     Quit date: 2018     Years since quittin.9    Smokeless tobacco: Never    Tobacco comments:     Quit on 2017   Vaping Use    Vaping Use: Every day    Substances: THC, CBD, Flavoring    Devices: Disposable   Substance Use Topics    Alcohol use: Yes     Comment: 1-2 mo.    Drug use: Yes     Types: Marijuana, Inhaled       Patient Active Problem List    Diagnosis Date Noted    IUD (intrauterine device) in place 2022    Bilateral hearing loss 2022    Encounter to establish care 2022    Allergic reaction 2022    GBS bacteriuria 2017    Rubella equivocal status, antepartum - needs PP vaccine 2017    Encounter for supervision of high risk pregnancy in second trimester, antepartum 2017    History of  section, desires repeat 2017  "      Family History   Problem Relation Age of Onset    Diabetes Mother     Hypertension Mother     Heart Disease Mother     Diabetes Father     Cancer Father         colon polyps    Hypertension Father     Heart Disease Maternal Grandmother     Heart Disease Maternal Grandfather           Objective:     /68 (BP Location: Left arm, Patient Position: Sitting, BP Cuff Size: Adult)   Pulse 79   Temp 36.1 °C (97 °F) (Skin)   Resp 19   Ht 1.575 m (5' 2\")   Wt 69.6 kg (153 lb 8 oz)   SpO2 97%  Body mass index is 28.08 kg/m².    Physical Exam:  Physical Exam  Vitals reviewed.   Constitutional:       General: She is awake.      Appearance: Normal appearance. She is well-developed.   HENT:      Head: Normocephalic.      Right Ear: There is impacted cerumen.   Eyes:      Conjunctiva/sclera: Conjunctivae normal.   Cardiovascular:      Rate and Rhythm: Normal rate.   Pulmonary:      Effort: Pulmonary effort is normal. No respiratory distress.   Musculoskeletal:      Cervical back: Neck supple.   Skin:     General: Skin is warm and dry.   Neurological:      Mental Status: She is alert and oriented to person, place, and time.   Psychiatric:         Mood and Affect: Mood normal.         Behavior: Behavior normal. Behavior is cooperative.            Assessment and Plan:     The following treatment plan was discussed:    Problem List Items Addressed This Visit       IUD (intrauterine device) in place     Ongoing-   US pelvic to ensure placement accurate  Ref to new OB/GYN          Relevant Orders    Referral to OB/Gyn    US-PELVIC COMPLETE (TRANSABDOMINAL/TRANSVAGINAL) (COMBO)    Bilateral hearing loss     Ongoing-   Ref to ENT          Relevant Orders    Referral to ENT     Other Visit Diagnoses       Need for vaccination                Any change or worsening of signs or symptoms, patient encouraged to follow-up or report to emergency room for further evaluation. Patient verbalizes understanding and " agrees.    Follow-Up:As needed       PLEASE NOTE: This dictation was created using voice recognition software. I have made every reasonable attempt to correct obvious errors, but I expect that there are errors of grammar and possibly content that I did not discover before finalizing the note.

## 2022-12-28 ENCOUNTER — APPOINTMENT (OUTPATIENT)
Dept: RADIOLOGY | Facility: MEDICAL CENTER | Age: 32
End: 2022-12-28
Attending: NURSE PRACTITIONER
Payer: MEDICAID

## 2023-01-25 ENCOUNTER — HOSPITAL ENCOUNTER (OUTPATIENT)
Dept: RADIOLOGY | Facility: MEDICAL CENTER | Age: 33
End: 2023-01-25
Attending: NURSE PRACTITIONER
Payer: MEDICAID

## 2023-01-25 DIAGNOSIS — Z97.5 IUD (INTRAUTERINE DEVICE) IN PLACE: ICD-10-CM

## 2023-01-25 PROCEDURE — 76830 TRANSVAGINAL US NON-OB: CPT

## 2023-04-09 ENCOUNTER — HOSPITAL ENCOUNTER (EMERGENCY)
Facility: MEDICAL CENTER | Age: 33
End: 2023-04-10
Attending: EMERGENCY MEDICINE
Payer: MEDICAID

## 2023-04-09 VITALS
SYSTOLIC BLOOD PRESSURE: 121 MMHG | HEIGHT: 62 IN | RESPIRATION RATE: 16 BRPM | WEIGHT: 156.53 LBS | OXYGEN SATURATION: 97 % | HEART RATE: 107 BPM | BODY MASS INDEX: 28.8 KG/M2 | TEMPERATURE: 98 F | DIASTOLIC BLOOD PRESSURE: 70 MMHG

## 2023-04-09 DIAGNOSIS — T15.02XA FOREIGN BODY OF LEFT CORNEA, INITIAL ENCOUNTER: ICD-10-CM

## 2023-04-09 DIAGNOSIS — H10.89 OTHER CONJUNCTIVITIS OF LEFT EYE: ICD-10-CM

## 2023-04-09 PROCEDURE — 700101 HCHG RX REV CODE 250: Performed by: EMERGENCY MEDICINE

## 2023-04-09 PROCEDURE — 99283 EMERGENCY DEPT VISIT LOW MDM: CPT

## 2023-04-09 RX ORDER — PROPARACAINE HYDROCHLORIDE 5 MG/ML
1 SOLUTION/ DROPS OPHTHALMIC ONCE
Status: COMPLETED | OUTPATIENT
Start: 2023-04-09 | End: 2023-04-09

## 2023-04-09 RX ORDER — CIPROFLOXACIN HYDROCHLORIDE 3.5 MG/ML
2 SOLUTION/ DROPS TOPICAL ONCE
Status: COMPLETED | OUTPATIENT
Start: 2023-04-09 | End: 2023-04-09

## 2023-04-09 RX ADMIN — PROPARACAINE HYDROCHLORIDE 1 DROP: 5 SOLUTION/ DROPS OPHTHALMIC at 23:00

## 2023-04-09 RX ADMIN — FLUORESCEIN SODIUM 1 MG: 1 STRIP OPHTHALMIC at 23:00

## 2023-04-09 RX ADMIN — CIPROFLOXACIN HYDROCHLORIDE 2 DROP: 3 SOLUTION/ DROPS OPHTHALMIC at 23:45

## 2023-04-09 ASSESSMENT — FIBROSIS 4 INDEX: FIB4 SCORE: 0.51

## 2023-04-10 NOTE — ED TRIAGE NOTES
.  Chief Complaint   Patient presents with    Eye Pain     OS, red      Pt ambulate to triage with above complaint. Pt reports eye is red, irritated, itchy and watery since beginning of March.    Pt educated on triage process and returned to lobby.

## 2023-04-10 NOTE — ED NOTES
"Discharge orders received. Pt educated on \"After Visit Summary\" and pt verbalized understanding without further questions. All belongings sent with pt upon departure from ED. Pt ambulated with a steady gait out of ED.    "

## 2023-04-10 NOTE — ED PROVIDER NOTES
ED Provider Note    CHIEF COMPLAINT  Chief Complaint   Patient presents with    Eye Pain     OS, red       EXTERNAL RECORDS REVIEWED  Outpatient Notes primary care visit  with difficulty hearing.  Longstanding issues with her ears.  Tubes placed several times.  Lost to follow-up with ENT.  Referral to an ENT provided    HPI/ROS  LIMITATION TO HISTORY   Select: : None  OUTSIDE HISTORIAN(S):  None    Rachel Green is a 33 y.o. female who presents to the emergency department through triage for left eye tearing, itching.  Patient describes intermittent left eye redness, itching, tearing for the last 4 to 5 weeks.  Sudafed used initially with some mild improvement, but states that this upsets her stomach and she did not take it for a long period dry, itchy eyes, tearing worsens throughout each day.  No purulent discharge or crusting upon waking.  Redness.  Denies swelling.  Denies any visual changes.  She does describe a history of sinus infections and thinks this may be related.  No headache, nasal congestion, ear pain or fever.  Denies sick contacts.    PAST MEDICAL HISTORY   Denies    SURGICAL HISTORY   has a past surgical history that includes tonsillectomy (08); gyn surgery; other; primary c section (2007); repeat c section (Bilateral, 2018); and repeat c section (2020).    FAMILY HISTORY  Family History   Problem Relation Age of Onset    Diabetes Mother     Hypertension Mother     Heart Disease Mother     Diabetes Father     Cancer Father         colon polyps    Hypertension Father     Heart Disease Maternal Grandmother     Heart Disease Maternal Grandfather        SOCIAL HISTORY  Social History     Tobacco Use    Smoking status: Former     Years: 3.00     Types: Cigarettes     Quit date: 2018     Years since quittin.2    Smokeless tobacco: Never    Tobacco comments:     Quit on 2017   Vaping Use    Vaping Use: Every day    Substances: THC, CBD, Flavoring    Devices:  "Disposable   Substance and Sexual Activity    Alcohol use: Yes     Comment: 1-2 mo.    Drug use: Yes     Types: Marijuana, Inhaled    Sexual activity: Yes     Partners: Male     Birth control/protection: Injection       CURRENT MEDICATIONS  Home Medications       Reviewed by Noemy Teresa R.N. (Registered Nurse) on 04/09/23 at 2147  Med List Status: Partial     Medication Last Dose Status        Patient Dion Taking any Medications                           ALLERGIES  Allergies   Allergen Reactions    Nkda [No Known Drug Allergy]        PHYSICAL EXAM  VITAL SIGNS: /66   Pulse 86   Temp 36.4 °C (97.6 °F) (Temporal)   Resp 16   Ht 1.575 m (5' 2\")   Wt 71 kg (156 lb 8.4 oz)   LMP 03/27/2023 (Approximate)   SpO2 98%   BMI 28.63 kg/m²    Pulse ox interpretation: I interpret this pulse ox as normal.  Constitutional: Alert in no apparent distress.  HENT: Normocephalic, atraumatic. Bilateral external ears normal.  Right TM with scarring, no bulging or effusion or erythema.  T-tube in place.  Left TM without bulging, erythema or effusion.  She does have a central perforation likely secondary to prior T-tube.  Nose normal.  No frontal or maxillary sinus discomfort with percussion.  No facial swelling or erythema.  Eyes: Bilateral conjunctival injection, left greater than right.  Left medial conjunctive a most injected.  No chemosis.  Clear tearing.  The left cornea approximate 7 o'clock position with a punctate flesh-colored lesion.  Pupils are equal and reactive bilaterally.  No hyphema, hypopyon.  EOMI without pain or resistance.  Eye exam: Visual acuity unremarkable (see nurses notes).  Proparacaine administration bilaterally with some improvement in discomfort, pruritus.  IOP right: 17, 17, 16 and left: 17, 1 18, 17.  Fluoroscene uptake at the punctate flesh-colored lesion described above at the 7 o'clock position of the left cornea.  No evidence for ulceration.  Neck: Normal range of motion, Supple.  " No meningeal irritation.  Lymphatic: No lymphadenopathy noted.   Cardiovascular: Normal peripheral perfusion  Thorax & Lungs: Nonlabored respirations  Skin: Warm, Dry  Musculoskeletal: Good range of motion in all major joints.   Neurologic: Alert and orient x4.  Ambulates in pendantly  Psychiatric: Affect normal, Judgment normal, Mood normal.       DIAGNOSTIC STUDIES / PROCEDURES      COURSE & MEDICAL DECISION MAKING    ED Observation Status? No; Patient does not meet criteria for ED Observation.     INITIAL ASSESSMENT, COURSE AND PLAN  Care Narrative:   ED evaluation for left eye redness, tearing and discomfort with pruritus does demonstrate a punctate flesh-colored foreign body over the 7 o'clock position of the left cornea.  Given duration of symptoms, greater than 1 month, without known foreign body presentation or obvious ulceration, there was no attempt made to remove this at this time.  Started on ciprofloxacin ophthalmic drops in the emergency department, to continue until follow-up with ophthalmology which was encouraged within 1 to 2 days.  Additionally, allergic conjunctivitis not excluded but seems less likely given.  No evidence for bacterial conjunctivitis, preseptal or septal cellulitis.  No evidence for sinusitis..  Clogged tear duct considered as well although no irritation, redness or fullness medially.  Again ophthalmology follow-up as appropriate in this setting.     ADDITIONAL PROBLEM LIST  Chronic hearing loss -awaiting new referral to ENT  DISPOSITION AND DISCUSSIONS  Stable for discharge home, anticipatory guidance provided, start using ciprofloxacin ophthalmic drops to the left eye as directed, close follow-up as recommended with ophthalmology (), and strict ED return instructions have been detailed.  She is aware of the findings and agreeable to the disposition and plan.    FINAL DIAGNOSIS  1. Foreign body of left cornea, initial encounter    2. Other conjunctivitis of left eye            Electronically signed by: Nicole Agosto D.O., 4/9/2023 10:31 PM

## 2023-12-08 ENCOUNTER — APPOINTMENT (OUTPATIENT)
Dept: URGENT CARE | Facility: CLINIC | Age: 33
End: 2023-12-08
Payer: MEDICAID

## 2024-02-01 NOTE — ANESTHESIA PREPROCEDURE EVALUATION
Relevant Problems   OB   (+) History of  section, desires repeat       Physical Exam    Airway   Mallampati: II  TM distance: >3 FB       Cardiovascular   Rhythm: regular  Rate: normal     Dental - normal exam           Pulmonary    Abdominal - normal exam     Neurological              Anesthesia Plan    ASA 2       Plan - spinal   Neuraxial block will be primary anesthetic      Plan Factors:   Patient did not smoke on day of procedure.          Postoperative Plan: Postoperative administration of opioids is intended.    Pertinent diagnostic labs and testing reviewed    Informed Consent:    Anesthetic plan and risks discussed with patient.    Use of blood products discussed with: patient whom.          no

## 2024-05-17 ENCOUNTER — OFFICE VISIT (OUTPATIENT)
Dept: MEDICAL GROUP | Facility: MEDICAL CENTER | Age: 34
End: 2024-05-17
Attending: NURSE PRACTITIONER
Payer: MEDICAID

## 2024-05-17 VITALS
OXYGEN SATURATION: 100 % | BODY MASS INDEX: 26.19 KG/M2 | DIASTOLIC BLOOD PRESSURE: 62 MMHG | WEIGHT: 143.2 LBS | HEART RATE: 75 BPM | TEMPERATURE: 96.7 F | RESPIRATION RATE: 16 BRPM | SYSTOLIC BLOOD PRESSURE: 100 MMHG

## 2024-05-17 DIAGNOSIS — Z97.5 IUD (INTRAUTERINE DEVICE) IN PLACE: ICD-10-CM

## 2024-05-17 DIAGNOSIS — Z13.228 SCREENING FOR ENDOCRINE, NUTRITIONAL, METABOLIC AND IMMUNITY DISORDER: ICD-10-CM

## 2024-05-17 DIAGNOSIS — Z13.6 SCREENING FOR CARDIOVASCULAR CONDITION: ICD-10-CM

## 2024-05-17 DIAGNOSIS — Z13.0 SCREENING FOR ENDOCRINE, NUTRITIONAL, METABOLIC AND IMMUNITY DISORDER: ICD-10-CM

## 2024-05-17 DIAGNOSIS — Z13.29 SCREENING FOR ENDOCRINE, NUTRITIONAL, METABOLIC AND IMMUNITY DISORDER: ICD-10-CM

## 2024-05-17 DIAGNOSIS — Z13.21 SCREENING FOR ENDOCRINE, NUTRITIONAL, METABOLIC AND IMMUNITY DISORDER: ICD-10-CM

## 2024-05-17 DIAGNOSIS — H91.93 BILATERAL HEARING LOSS, UNSPECIFIED HEARING LOSS TYPE: ICD-10-CM

## 2024-05-17 PROCEDURE — 3078F DIAST BP <80 MM HG: CPT | Performed by: NURSE PRACTITIONER

## 2024-05-17 PROCEDURE — 3074F SYST BP LT 130 MM HG: CPT | Performed by: NURSE PRACTITIONER

## 2024-05-17 PROCEDURE — 99214 OFFICE O/P EST MOD 30 MIN: CPT | Performed by: NURSE PRACTITIONER

## 2024-05-17 ASSESSMENT — PATIENT HEALTH QUESTIONNAIRE - PHQ9: CLINICAL INTERPRETATION OF PHQ2 SCORE: 0

## 2024-05-28 NOTE — PROGRESS NOTES
Verbal consent was acquired by the patient to use [employer] ambient listening note generation during this visit     Chief Complaint   Patient presents with    Referral Needed       Subjective:     HPI:   History of Present Illness  The patient presents for establishment of care.    The patient maintains an intrauterine device (IUD) in place and is seeking to establish a relationship with a gynecologist for IUD removal.    The patient expresses a desire to consult with an Ear, Nose, and Throat (ENT) specialist due to persistent hearing loss, which she has been experiencing intermittently since the age of 4. She recounts an incident where she nearly deaf due to perceived hearing loss, which she subsequently passed onto her four children. Despite attempts to use ear plugs during swimming activities, she lost them all and subsequently lost their selection.        No problems updated.    ROS  See HPI     Allergies   Allergen Reactions    Nkda [No Known Drug Allergy]        Current medicines (including changes today)  No current outpatient medications on file.     No current facility-administered medications for this visit.       Social History     Tobacco Use    Smoking status: Former     Current packs/day: 0.00     Types: Cigarettes     Start date: 2015     Quit date: 2018     Years since quittin.4    Smokeless tobacco: Never    Tobacco comments:     Quit on 2017   Vaping Use    Vaping status: Every Day    Substances: THC, CBD, Flavoring    Devices: Disposable   Substance Use Topics    Alcohol use: Yes     Comment: 1-2 mo.    Drug use: Yes     Types: Marijuana, Inhaled       Patient Active Problem List    Diagnosis Date Noted    IUD (intrauterine device) in place 2022    Bilateral hearing loss 2022    Encounter to establish care 2022    Allergic reaction 2022    GBS bacteriuria 2017    Rubella equivocal status, antepartum - needs PP vaccine 2017    Encounter for  supervision of high risk pregnancy in second trimester, antepartum 2017    History of  section, desires repeat 2017       Family History   Problem Relation Age of Onset    Diabetes Mother     Hypertension Mother     Heart Disease Mother     Diabetes Father     Cancer Father         colon polyps    Hypertension Father     Heart Disease Maternal Grandmother     Heart Disease Maternal Grandfather           Objective:     /62 (BP Location: Left arm, Patient Position: Sitting, BP Cuff Size: Adult)   Pulse 75   Temp 35.9 °C (96.7 °F) (Temporal)   Resp 16   Wt 65 kg (143 lb 3.2 oz)   SpO2 100%  Body mass index is 26.19 kg/m².    Physical Exam:  Physical Exam  Vitals reviewed.   Constitutional:       General: She is awake.      Appearance: Normal appearance. She is well-developed.   HENT:      Head: Normocephalic.      Nose: Nose normal.      Mouth/Throat:      Mouth: Mucous membranes are moist.      Pharynx: Oropharynx is clear. No oropharyngeal exudate.   Eyes:      Conjunctiva/sclera: Conjunctivae normal.      Pupils: Pupils are equal, round, and reactive to light.   Cardiovascular:      Rate and Rhythm: Normal rate and regular rhythm.      Heart sounds: Normal heart sounds.   Pulmonary:      Effort: Pulmonary effort is normal. No respiratory distress.      Breath sounds: Normal breath sounds. No wheezing.   Musculoskeletal:      Cervical back: Neck supple.   Skin:     General: Skin is warm and dry.   Neurological:      Mental Status: She is alert and oriented to person, place, and time.   Psychiatric:         Mood and Affect: Mood normal.         Behavior: Behavior normal. Behavior is cooperative.              Assessment and Plan:     The following treatment plan was discussed:    Problem List Items Addressed This Visit       IUD (intrauterine device) in place    Relevant Orders    Referral to OB/Gyn    Bilateral hearing loss    Relevant Orders    Referral to ENT     Other Visit Diagnoses        Screening for endocrine, nutritional, metabolic and immunity disorder        Relevant Orders    VITAMIN D,25 HYDROXY (DEFICIENCY)    Screening for cardiovascular condition        Relevant Orders    HEMOGLOBIN A1C    Lipid Profile    Comp Metabolic Panel            Assessment & Plan  1. Health maintenance.  A comprehensive blood work order has been issued, which the patient is advised to complete at her earliest convenience.  2. Hearing loss-   Uncontrolled and unclear diagnosis- Will refer to ENT and audiology for testing.     Any change or worsening of signs or symptoms, patient encouraged to follow-up or report to emergency room for further evaluation. Patient verbalizes understanding and agrees.      PLEASE NOTE: This dictation was created using voice recognition software. I have made every reasonable attempt to correct obvious errors, but I expect that there are errors of grammar and possibly content that I did not discover before finalizing the note.

## 2024-09-12 ENCOUNTER — APPOINTMENT (OUTPATIENT)
Dept: RADIOLOGY | Facility: MEDICAL CENTER | Age: 34
End: 2024-09-12
Attending: STUDENT IN AN ORGANIZED HEALTH CARE EDUCATION/TRAINING PROGRAM
Payer: MEDICAID

## 2024-09-12 ENCOUNTER — HOSPITAL ENCOUNTER (EMERGENCY)
Facility: MEDICAL CENTER | Age: 34
End: 2024-09-13
Attending: STUDENT IN AN ORGANIZED HEALTH CARE EDUCATION/TRAINING PROGRAM
Payer: MEDICAID

## 2024-09-12 DIAGNOSIS — N89.8 VAGINAL DISCHARGE: ICD-10-CM

## 2024-09-12 DIAGNOSIS — N93.9 VAGINAL BLEEDING: ICD-10-CM

## 2024-09-12 DIAGNOSIS — B96.89 BACTERIAL VAGINOSIS: ICD-10-CM

## 2024-09-12 DIAGNOSIS — N76.0 BACTERIAL VAGINOSIS: ICD-10-CM

## 2024-09-12 LAB
ALBUMIN SERPL BCP-MCNC: 4.5 G/DL (ref 3.2–4.9)
ALBUMIN/GLOB SERPL: 1.8 G/DL
ALP SERPL-CCNC: 54 U/L (ref 30–99)
ALT SERPL-CCNC: 17 U/L (ref 2–50)
ANION GAP SERPL CALC-SCNC: 13 MMOL/L (ref 7–16)
APPEARANCE UR: CLEAR
AST SERPL-CCNC: 19 U/L (ref 12–45)
BACTERIA #/AREA URNS HPF: NEGATIVE /HPF
BASOPHILS # BLD AUTO: 0 % (ref 0–1.8)
BASOPHILS # BLD: 0 K/UL (ref 0–0.12)
BILIRUB SERPL-MCNC: 0.3 MG/DL (ref 0.1–1.5)
BILIRUB UR QL STRIP.AUTO: NEGATIVE
BUN SERPL-MCNC: 19 MG/DL (ref 8–22)
CALCIUM ALBUM COR SERPL-MCNC: 9.2 MG/DL (ref 8.5–10.5)
CALCIUM SERPL-MCNC: 9.6 MG/DL (ref 8.5–10.5)
CANDIDA DNA VAG QL PROBE+SIG AMP: NEGATIVE
CHLORIDE SERPL-SCNC: 106 MMOL/L (ref 96–112)
CO2 SERPL-SCNC: 24 MMOL/L (ref 20–33)
COLOR UR: YELLOW
COMMENT NL1176: NORMAL
CREAT SERPL-MCNC: 0.55 MG/DL (ref 0.5–1.4)
EOSINOPHIL # BLD AUTO: 0 K/UL (ref 0–0.51)
EOSINOPHIL NFR BLD: 0 % (ref 0–6.9)
EPI CELLS #/AREA URNS HPF: NORMAL /HPF
ERYTHROCYTE [DISTWIDTH] IN BLOOD BY AUTOMATED COUNT: 41.3 FL (ref 35.9–50)
G VAGINALIS DNA VAG QL PROBE+SIG AMP: POSITIVE
GFR SERPLBLD CREATININE-BSD FMLA CKD-EPI: 123 ML/MIN/1.73 M 2
GLOBULIN SER CALC-MCNC: 2.5 G/DL (ref 1.9–3.5)
GLUCOSE SERPL-MCNC: 109 MG/DL (ref 65–99)
GLUCOSE UR STRIP.AUTO-MCNC: NEGATIVE MG/DL
HCG SERPL QL: NEGATIVE
HCT VFR BLD AUTO: 38.7 % (ref 37–47)
HGB BLD-MCNC: 13.4 G/DL (ref 12–16)
HYALINE CASTS #/AREA URNS LPF: NORMAL /LPF
KETONES UR STRIP.AUTO-MCNC: NEGATIVE MG/DL
LEUKOCYTE ESTERASE UR QL STRIP.AUTO: NEGATIVE
LIPASE SERPL-CCNC: 35 U/L (ref 11–82)
LYMPHOCYTES # BLD AUTO: 4.38 K/UL (ref 1–4.8)
LYMPHOCYTES NFR BLD: 42.1 % (ref 22–41)
MANUAL DIFF BLD: NORMAL
MCH RBC QN AUTO: 31.8 PG (ref 27–33)
MCHC RBC AUTO-ENTMCNC: 34.6 G/DL (ref 32.2–35.5)
MCV RBC AUTO: 91.9 FL (ref 81.4–97.8)
MICRO URNS: ABNORMAL
MONOCYTES # BLD AUTO: 0.46 K/UL (ref 0–0.85)
MONOCYTES NFR BLD AUTO: 4.4 % (ref 0–13.4)
MORPHOLOGY BLD-IMP: NORMAL
NEUTROPHILS # BLD AUTO: 5.56 K/UL (ref 1.82–7.42)
NEUTROPHILS NFR BLD: 53.5 % (ref 44–72)
NITRITE UR QL STRIP.AUTO: NEGATIVE
NRBC # BLD AUTO: 0 K/UL
NRBC BLD-RTO: 0 /100 WBC (ref 0–0.2)
PH UR STRIP.AUTO: 6 [PH] (ref 5–8)
PLATELET # BLD AUTO: 233 K/UL (ref 164–446)
PLATELET BLD QL SMEAR: NORMAL
PMV BLD AUTO: 9.3 FL (ref 9–12.9)
POTASSIUM SERPL-SCNC: 4.5 MMOL/L (ref 3.6–5.5)
PROT SERPL-MCNC: 7 G/DL (ref 6–8.2)
PROT UR QL STRIP: NEGATIVE MG/DL
RBC # BLD AUTO: 4.21 M/UL (ref 4.2–5.4)
RBC # URNS HPF: NORMAL /HPF
RBC BLD AUTO: NORMAL
RBC UR QL AUTO: ABNORMAL
SODIUM SERPL-SCNC: 143 MMOL/L (ref 135–145)
SP GR UR STRIP.AUTO: 1.02
T VAGINALIS DNA VAG QL PROBE+SIG AMP: NEGATIVE
UROBILINOGEN UR STRIP.AUTO-MCNC: 0.2 MG/DL
WBC # BLD AUTO: 10.4 K/UL (ref 4.8–10.8)
WBC #/AREA URNS HPF: NORMAL /HPF

## 2024-09-12 PROCEDURE — 81001 URINALYSIS AUTO W/SCOPE: CPT

## 2024-09-12 PROCEDURE — 87591 N.GONORRHOEAE DNA AMP PROB: CPT

## 2024-09-12 PROCEDURE — 84703 CHORIONIC GONADOTROPIN ASSAY: CPT

## 2024-09-12 PROCEDURE — 83690 ASSAY OF LIPASE: CPT

## 2024-09-12 PROCEDURE — 85007 BL SMEAR W/DIFF WBC COUNT: CPT

## 2024-09-12 PROCEDURE — 99284 EMERGENCY DEPT VISIT MOD MDM: CPT

## 2024-09-12 PROCEDURE — 87491 CHLMYD TRACH DNA AMP PROBE: CPT

## 2024-09-12 PROCEDURE — 76830 TRANSVAGINAL US NON-OB: CPT

## 2024-09-12 PROCEDURE — 85027 COMPLETE CBC AUTOMATED: CPT

## 2024-09-12 PROCEDURE — 87660 TRICHOMONAS VAGIN DIR PROBE: CPT

## 2024-09-12 PROCEDURE — 87480 CANDIDA DNA DIR PROBE: CPT

## 2024-09-12 PROCEDURE — 80053 COMPREHEN METABOLIC PANEL: CPT

## 2024-09-12 PROCEDURE — 87510 GARDNER VAG DNA DIR PROBE: CPT

## 2024-09-12 PROCEDURE — 36415 COLL VENOUS BLD VENIPUNCTURE: CPT

## 2024-09-12 RX ORDER — METRONIDAZOLE 500 MG/1
500 TABLET ORAL 2 TIMES DAILY
Qty: 14 TABLET | Refills: 0 | Status: ACTIVE | OUTPATIENT
Start: 2024-09-12 | End: 2024-09-19

## 2024-09-12 RX ORDER — METRONIDAZOLE 500 MG/1
500 TABLET ORAL 2 TIMES DAILY
Qty: 14 TABLET | Refills: 0 | Status: SHIPPED | OUTPATIENT
Start: 2024-09-12 | End: 2024-09-12

## 2024-09-12 ASSESSMENT — PAIN DESCRIPTION - PAIN TYPE: TYPE: ACUTE PAIN

## 2024-09-13 ENCOUNTER — TELEPHONE (OUTPATIENT)
Dept: OBGYN | Facility: CLINIC | Age: 34
End: 2024-09-13
Payer: MEDICAID

## 2024-09-13 ENCOUNTER — PATIENT OUTREACH (OUTPATIENT)
Dept: SCHEDULING | Facility: IMAGING CENTER | Age: 34
End: 2024-09-13
Payer: MEDICAID

## 2024-09-13 VITALS
HEART RATE: 66 BPM | RESPIRATION RATE: 16 BRPM | DIASTOLIC BLOOD PRESSURE: 85 MMHG | WEIGHT: 145.28 LBS | TEMPERATURE: 97.5 F | SYSTOLIC BLOOD PRESSURE: 119 MMHG | OXYGEN SATURATION: 99 % | HEIGHT: 62 IN | BODY MASS INDEX: 26.74 KG/M2

## 2024-09-13 LAB
C TRACH DNA GENITAL QL NAA+PROBE: NEGATIVE
N GONORRHOEA DNA GENITAL QL NAA+PROBE: NEGATIVE
SPECIMEN SOURCE: NORMAL

## 2024-09-13 NOTE — TELEPHONE ENCOUNTER
9/13/2024 1319  Left message for pt to call back regarding ER visit and see if she wants to establish care for abd vaginal bleeding  NOTE: Holding appt 0n 10/1 with Dr Gomes at 0815 and 0915, will move 0830 OB f/u to 0915 if possible.    9/17/2024 1214    Left message for pt to call back regarding ER visit and see if she wants to establish care in this clinic    9/19/2024  Pt called back and would like to establish care in our clinic. Pt is still having some spotting and cramping, but the cramping is less than when she went to the ER and is manageable.  Scheduled appt for 10/1/2024 with Dr Gomes at 0815. Address to clinic given to pt and informed to check in at 0800. Pt agreed and verbalized understanding.

## 2024-09-13 NOTE — ED TRIAGE NOTES
Rachel Green  34 y.o.  female  Chief Complaint   Patient presents with    Painful Urination     C/o painful urination x 2 weeks     Vaginal Bleeding     Off and on x 2 weeks. Spotting at first. Now moderate in amount. - irregular menses.

## 2024-09-13 NOTE — DISCHARGE INSTRUCTIONS
You are seen in the emergency room for evaluation of abdominal pain and painful urination.  You do not have a urinary tract infection.  Your pelvic ultrasound is normal it shows that your IUD is in appropriate position.  I will call you if your vaginal pathogens is abnormal however if you do not hear from me that this visit everything came back negative.  I have referred you to a gynecologist and a  should call you to help schedule an appointment.  Return for any worsening pain, fevers, vomiting, any other concerns.

## 2024-09-13 NOTE — ED PROVIDER NOTES
ED Provider Note    CHIEF COMPLAINT  Chief Complaint   Patient presents with    Painful Urination     C/o painful urination x 2 weeks     Vaginal Bleeding     Off and on x 2 weeks. Spotting at first. Now moderate in amount. - irregular menses.       EXTERNAL RECORDS REVIEWED  Outpatient Notes patient was seen by her PCP May 17, 2024 requesting a GYN for IUD removal    HPI/ROS  LIMITATION TO HISTORY   Select: : None  OUTSIDE HISTORIAN(S):  None    Rachel Green is a 34 y.o.  female who presents for evaluation of vaginal bleeding.  She notes that she had a Mirena IUD placed approximately a year and a half ago.  She has not had any vaginal bleeding until 2 weeks ago after having sexual intercourse with her .  Since then the bleeding has continued.  All of the most severe of days she bleeds two pads.  She otherwise just spots.  She is having some lower abdominal discomfort.  She denies any fever, chills, dysuria, frequency, vaginal discharge.  She is sexually active with her  only.  She has no history of STI.  She notes that she has had a funny odor in her vaginal area for the past 6 weeks.  She wants to follow-up with a GYN however her GYN went to snf therefore she is looking for a new GYN.    PAST MEDICAL HISTORY   She has no chronic medical problems    SURGICAL HISTORY   has a past surgical history that includes tonsillectomy (08); gyn surgery; other; primary c section (2007); repeat c section (Bilateral, 2018); and repeat c section (2020).    FAMILY HISTORY  Family History   Problem Relation Age of Onset    Diabetes Mother     Hypertension Mother     Heart Disease Mother     Diabetes Father     Cancer Father         colon polyps    Hypertension Father     Heart Disease Maternal Grandmother     Heart Disease Maternal Grandfather        SOCIAL HISTORY  Social History     Tobacco Use    Smoking status: Former     Current packs/day: 0.00     Types: Cigarettes     Start  "date: 2015     Quit date: 2018     Years since quittin.7    Smokeless tobacco: Never    Tobacco comments:     Quit on 2017   Vaping Use    Vaping status: Every Day    Substances: THC, CBD, Flavoring    Devices: Disposable   Substance and Sexual Activity    Alcohol use: Yes     Comment: 1-2 mo.    Drug use: Yes     Types: Marijuana, Inhaled    Sexual activity: Yes     Partners: Male     Birth control/protection: Injection       CURRENT MEDICATIONS  Home Medications       Reviewed by Joon Hernandez R.N. (Registered Nurse) on 24 at 2123  Med List Status: Partial     Medication Last Dose Status        Patient Dion Taking any Medications                           ALLERGIES  Allergies   Allergen Reactions    Nkda [No Known Drug Allergy]        PHYSICAL EXAM  VITAL SIGNS: /81   Pulse 75   Temp 36.3 °C (97.3 °F) (Temporal)   Resp 15   Ht 1.575 m (5' 2\")   Wt 65.9 kg (145 lb 4.5 oz)   SpO2 100%   BMI 26.57 kg/m²      Constitutional: Well developed, Well nourished, No acute respiratory distress, Non-toxic appearance.   HENT: Normocephalic, Atraumatic, Bilateral external ears normal, Oropharynx clear, mucous membranes are moist.  Cardiovascular: Normal heart rate, Normal rhythm, No murmurs, No rubs, No gallops.   Thorax & Lungs: Clear to auscultation bilaterally, No respiratory distress, No wheezing.  Abdomen: Soft nontender normal bowel sounds no rebound masses or peritoneal signs  Pelvic: With female nurse chaperone, normal external female genitalia without lesions. Scant brownish blood in vaginal vault. Cervix is normal appearing, difficult to visualize IUD strings. Cervix is closed, no CMT  Skin: Warm, Dry, No erythema, No rash.   Extremities: Intact distal pulses, No edema, No tenderness  Neurologic: Alert & oriented. No focal deficits noted.   Psych: Alert normal affect       EKG/LABS  Results for orders placed or performed during the hospital encounter of 24   CBC WITH DIFFERENTIAL "   Result Value Ref Range    WBC 10.4 4.8 - 10.8 K/uL    RBC 4.21 4.20 - 5.40 M/uL    Hemoglobin 13.4 12.0 - 16.0 g/dL    Hematocrit 38.7 37.0 - 47.0 %    MCV 91.9 81.4 - 97.8 fL    MCH 31.8 27.0 - 33.0 pg    MCHC 34.6 32.2 - 35.5 g/dL    RDW 41.3 35.9 - 50.0 fL    Platelet Count 233 164 - 446 K/uL    MPV 9.3 9.0 - 12.9 fL    Neutrophils-Polys 53.50 44.00 - 72.00 %    Lymphocytes 42.10 (H) 22.00 - 41.00 %    Monocytes 4.40 0.00 - 13.40 %    Eosinophils 0.00 0.00 - 6.90 %    Basophils 0.00 0.00 - 1.80 %    Nucleated RBC 0.00 0.00 - 0.20 /100 WBC    Neutrophils (Absolute) 5.56 1.82 - 7.42 K/uL    Lymphs (Absolute) 4.38 1.00 - 4.80 K/uL    Monos (Absolute) 0.46 0.00 - 0.85 K/uL    Eos (Absolute) 0.00 0.00 - 0.51 K/uL    Baso (Absolute) 0.00 0.00 - 0.12 K/uL    NRBC (Absolute) 0.00 K/uL   COMP METABOLIC PANEL   Result Value Ref Range    Sodium 143 135 - 145 mmol/L    Potassium 4.5 3.6 - 5.5 mmol/L    Chloride 106 96 - 112 mmol/L    Co2 24 20 - 33 mmol/L    Anion Gap 13.0 7.0 - 16.0    Glucose 109 (H) 65 - 99 mg/dL    Bun 19 8 - 22 mg/dL    Creatinine 0.55 0.50 - 1.40 mg/dL    Calcium 9.6 8.5 - 10.5 mg/dL    Correct Calcium 9.2 8.5 - 10.5 mg/dL    AST(SGOT) 19 12 - 45 U/L    ALT(SGPT) 17 2 - 50 U/L    Alkaline Phosphatase 54 30 - 99 U/L    Total Bilirubin 0.3 0.1 - 1.5 mg/dL    Albumin 4.5 3.2 - 4.9 g/dL    Total Protein 7.0 6.0 - 8.2 g/dL    Globulin 2.5 1.9 - 3.5 g/dL    A-G Ratio 1.8 g/dL   LIPASE   Result Value Ref Range    Lipase 35 11 - 82 U/L   HCG QUAL SERUM   Result Value Ref Range    Beta-Hcg Qualitative Serum Negative Negative   URINALYSIS,CULTURE IF INDICATED    Specimen: Urine   Result Value Ref Range    Color Yellow     Character Clear     Specific Gravity 1.018 <1.035    Ph 6.0 5.0 - 8.0    Glucose Negative Negative mg/dL    Ketones Negative Negative mg/dL    Protein Negative Negative mg/dL    Bilirubin Negative Negative    Urobilinogen, Urine 0.2 Negative    Nitrite Negative Negative    Leukocyte Esterase  Negative Negative    Occult Blood Small (A) Negative    Micro Urine Req Microscopic    Chlamydia/GC, PCR (Genital/Anal swab)    Specimen: Genital   Result Value Ref Range    Source Urine    Vaginal Pathogens DNA Panel (Regional Only)   Result Value Ref Range    Candida species DNA Probe Negative Negative    Trichamonas vaginalis DNA Probe Negative Negative    Gardnerella vaginalis DNA Probe POSITIVE (A) Negative   URINE MICROSCOPIC (W/UA)   Result Value Ref Range    WBC 0-2 /hpf    RBC 0-2 /hpf    Bacteria Negative None /hpf    Epithelial Cells Few /hpf    Hyaline Cast 0-2 /lpf   DIFFERENTIAL MANUAL   Result Value Ref Range    Manual Diff Status PERFORMED     Comment See Comment    PERIPHERAL SMEAR REVIEW   Result Value Ref Range    Peripheral Smear Review see below    PLATELET ESTIMATE   Result Value Ref Range    Plt Estimation Normal    MORPHOLOGY   Result Value Ref Range    RBC Morphology Normal    ESTIMATED GFR   Result Value Ref Range    GFR (CKD-EPI) 123 >60 mL/min/1.73 m 2       I have independently interpreted this EKG    RADIOLOGY/PROCEDURES   I have independently interpreted the diagnostic imaging associated with this visit and am waiting the final reading from the radiologist.   My preliminary interpretation is as follows: + IUD in place    Radiologist interpretation:  US-PELVIC COMPLETE (TRANSABDOMINAL/TRANSVAGINAL) (COMBO)   Final Result      1.  Normal transvaginal appearance of the pelvis.   2.  Intrauterine device in place within the endometrial canal.          COURSE & MEDICAL DECISION MAKING    ASSESSMENT, COURSE AND PLAN  Care Narrative:   This is a 34-year-old female who is presenting for evaluation of vaginal bleeding that has been ongoing for the past 2 weeks.  It ranges from scant to using 2 pads in a 24-hour period.  On arrival her vital signs are stable.  Abdomen is soft and nontender.  Pelvic exam is reassuring however it is difficult to visualize her IUD strings.  There is no evidence of  cervical motion tenderness to raise concern for PID.    Labs without leukocytosis.  She is not anemic.  She is not pregnant.  UA negative for infection.  She doubts STI therefore screening was performed however she is low risk given she has been augments with her  therefore we will wait for results.  She is found to be Gardnerella positive, concerning for BV therefore we will treat with a course of Flagyl.  She is advised not drink alcohol while on Flagyl.  Pelvic ultrasound was obtained and shows normal transvaginal appearance of pelvis with IUD in place.  I referred patient to [facility] for ongoing gynecologic care.  She will return for any worsening symptoms or new concerns.  Patient is agreeable to discharge plan with no further questions.            ADDITIONAL PROBLEMS MANAGED  None    DISPOSITION AND DISCUSSIONS  I have discussed management of the patient with the following physicians and RICHI's:  None    Discussion of management with other [facility] or appropriate source(s): None     Escalation of care considered, and ultimately not performed:acute inpatient care management, however at this time, the patient is most appropriate for outpatient management    Barriers to care at this time, including but not limited to:  None .     Decision tools and prescription drugs considered including, but not limited to: Antibiotics flagyl .     The patient will return for new or worsening symptoms and is stable at the time of discharge.    The patient is referred to a primary physician for blood pressure management, diabetic screening, and for all other preventative health concerns.      DISPOSITION:  Patient will be discharged home in stable condition.    FOLLOW UP:  Chloe العراقي M.D.  [address] [address]  [phone]          [facility], Emergency Dept  [address] [facility] [id number] [phone]          OUTPATIENT MEDICATIONS:  Discharge Medication  List as of 9/13/2024 12:00 AM            FINAL DIAGNOSIS  1. Vaginal bleeding    2. Vaginal discharge    3. Bacterial vaginosis         Electronically signed by: Heaven Sotelo M.D., 9/12/2024 9:50 PM

## 2024-09-13 NOTE — ED NOTES
"Rachel Green has been discharged from the Emergency Room.    Pt in possession of belongings.    Discharge instructions, which include signs and symptoms to monitor patient for.  Patient verbalizes understanding of follow up care and medication management. All questions and concerns addressed at this time.     Patient provided with education on when to return to the ER and verbally understands with no concerns. Patient advised on setting up MyChart and information provided about patient survey.  Patient leaves ER in no apparent distress. This RN provided education regarding returning to the ER for any new concerns or changes in patient's condition.      /85   Pulse 66   Temp 36.4 °C (97.5 °F) (Temporal)   Resp 16   Ht 1.575 m (5' 2\")   Wt 65.9 kg (145 lb 4.5 oz)   SpO2 99%   BMI 26.57 kg/m²     "

## 2024-09-13 NOTE — ED NOTES
PT ambulated to PUR 74 with a steady gate. C/C is vaginal bleeding. Pt is on the monitor. Chart up for ERP.

## 2024-09-13 NOTE — ED NOTES
Checked on bed, with unlabored respirations. Denied any new complaints. No current needs identified.  Gurney in low position, side rail up for pt safety. Call light within reach.

## 2024-10-01 ENCOUNTER — HOSPITAL ENCOUNTER (OUTPATIENT)
Facility: MEDICAL CENTER | Age: 34
End: 2024-10-01
Attending: OBSTETRICS & GYNECOLOGY
Payer: MEDICAID

## 2024-10-01 ENCOUNTER — APPOINTMENT (OUTPATIENT)
Dept: OBGYN | Facility: CLINIC | Age: 34
End: 2024-10-01
Payer: MEDICAID

## 2024-10-01 VITALS
WEIGHT: 144.2 LBS | HEIGHT: 62 IN | BODY MASS INDEX: 26.54 KG/M2 | DIASTOLIC BLOOD PRESSURE: 70 MMHG | SYSTOLIC BLOOD PRESSURE: 116 MMHG

## 2024-10-01 DIAGNOSIS — N93.0 POSTCOITAL BLEEDING: ICD-10-CM

## 2024-10-01 DIAGNOSIS — Z12.4 PAP SMEAR FOR CERVICAL CANCER SCREENING: ICD-10-CM

## 2024-10-01 DIAGNOSIS — Z97.5 IUD (INTRAUTERINE DEVICE) IN PLACE: ICD-10-CM

## 2024-10-01 PROCEDURE — 3074F SYST BP LT 130 MM HG: CPT | Performed by: OBSTETRICS & GYNECOLOGY

## 2024-10-01 PROCEDURE — 3078F DIAST BP <80 MM HG: CPT | Performed by: OBSTETRICS & GYNECOLOGY

## 2024-10-01 PROCEDURE — 87624 HPV HI-RISK TYP POOLED RSLT: CPT

## 2024-10-01 PROCEDURE — 88175 CYTOPATH C/V AUTO FLUID REDO: CPT

## 2024-10-01 PROCEDURE — 99203 OFFICE O/P NEW LOW 30 MIN: CPT | Performed by: OBSTETRICS & GYNECOLOGY

## 2024-10-01 PROCEDURE — 87491 CHLMYD TRACH DNA AMP PROBE: CPT

## 2024-10-01 PROCEDURE — 87591 N.GONORRHOEAE DNA AMP PROB: CPT

## 2024-10-01 ASSESSMENT — FIBROSIS 4 INDEX: FIB4 SCORE: 0.67

## 2024-10-05 LAB
C TRACH RRNA CVX QL NAA+PROBE: NEGATIVE
CYTOLOGIST CVX/VAG CYTO: NORMAL
CYTOLOGY CVX/VAG DOC CYTO: NORMAL
CYTOLOGY CVX/VAG DOC THIN PREP: NORMAL
HPV I/H RISK 4 DNA CVX QL PROBE+SIG AMP: NEGATIVE
N GONORRHOEA RRNA CVX QL NAA+PROBE: NEGATIVE
NOTE NL11727A: NORMAL
OTHER STN SPEC: NORMAL
STAT OF ADQ CVX/VAG CYTO-IMP: NORMAL

## 2024-11-18 ENCOUNTER — OFFICE VISIT (OUTPATIENT)
Dept: MEDICAL GROUP | Facility: MEDICAL CENTER | Age: 34
End: 2024-11-18
Attending: NURSE PRACTITIONER
Payer: MEDICAID

## 2024-11-18 VITALS
SYSTOLIC BLOOD PRESSURE: 92 MMHG | HEART RATE: 70 BPM | RESPIRATION RATE: 16 BRPM | OXYGEN SATURATION: 92 % | TEMPERATURE: 96.8 F | WEIGHT: 147.2 LBS | DIASTOLIC BLOOD PRESSURE: 60 MMHG | BODY MASS INDEX: 26.92 KG/M2

## 2024-11-18 DIAGNOSIS — Z23 NEED FOR VACCINATION: ICD-10-CM

## 2024-11-18 DIAGNOSIS — M25.531 PAIN IN BOTH WRISTS: ICD-10-CM

## 2024-11-18 DIAGNOSIS — M79.644 PAIN OF RIGHT THUMB: ICD-10-CM

## 2024-11-18 DIAGNOSIS — M25.532 PAIN IN BOTH WRISTS: ICD-10-CM

## 2024-11-18 PROCEDURE — 90471 IMMUNIZATION ADMIN: CPT

## 2024-11-18 PROCEDURE — 3078F DIAST BP <80 MM HG: CPT | Performed by: NURSE PRACTITIONER

## 2024-11-18 PROCEDURE — 99213 OFFICE O/P EST LOW 20 MIN: CPT | Performed by: NURSE PRACTITIONER

## 2024-11-18 PROCEDURE — 99214 OFFICE O/P EST MOD 30 MIN: CPT | Performed by: NURSE PRACTITIONER

## 2024-11-18 PROCEDURE — 3074F SYST BP LT 130 MM HG: CPT | Performed by: NURSE PRACTITIONER

## 2024-11-18 RX ORDER — MELOXICAM 15 MG/1
15 TABLET ORAL DAILY
Qty: 30 TABLET | Refills: 2 | Status: SHIPPED | OUTPATIENT
Start: 2024-11-18

## 2024-11-18 ASSESSMENT — FIBROSIS 4 INDEX: FIB4 SCORE: 0.67

## 2024-11-19 ENCOUNTER — HOSPITAL ENCOUNTER (OUTPATIENT)
Dept: LAB | Facility: MEDICAL CENTER | Age: 34
End: 2024-11-19
Attending: NURSE PRACTITIONER
Payer: MEDICAID

## 2024-11-19 ENCOUNTER — HOSPITAL ENCOUNTER (OUTPATIENT)
Dept: RADIOLOGY | Facility: MEDICAL CENTER | Age: 34
End: 2024-11-19
Attending: NURSE PRACTITIONER
Payer: MEDICAID

## 2024-11-19 DIAGNOSIS — Z13.0 SCREENING FOR ENDOCRINE, NUTRITIONAL, METABOLIC AND IMMUNITY DISORDER: ICD-10-CM

## 2024-11-19 DIAGNOSIS — M25.531 PAIN IN BOTH WRISTS: ICD-10-CM

## 2024-11-19 DIAGNOSIS — Z13.6 SCREENING FOR CARDIOVASCULAR CONDITION: ICD-10-CM

## 2024-11-19 DIAGNOSIS — Z13.29 SCREENING FOR ENDOCRINE, NUTRITIONAL, METABOLIC AND IMMUNITY DISORDER: ICD-10-CM

## 2024-11-19 DIAGNOSIS — M79.644 PAIN OF RIGHT THUMB: ICD-10-CM

## 2024-11-19 DIAGNOSIS — Z13.21 SCREENING FOR ENDOCRINE, NUTRITIONAL, METABOLIC AND IMMUNITY DISORDER: ICD-10-CM

## 2024-11-19 DIAGNOSIS — M25.532 PAIN IN BOTH WRISTS: ICD-10-CM

## 2024-11-19 DIAGNOSIS — Z13.228 SCREENING FOR ENDOCRINE, NUTRITIONAL, METABOLIC AND IMMUNITY DISORDER: ICD-10-CM

## 2024-11-19 LAB
25(OH)D3 SERPL-MCNC: 21 NG/ML (ref 30–100)
ALBUMIN SERPL BCP-MCNC: 4.9 G/DL (ref 3.2–4.9)
ALBUMIN/GLOB SERPL: 2.1 G/DL
ALP SERPL-CCNC: 53 U/L (ref 30–99)
ALT SERPL-CCNC: 23 U/L (ref 2–50)
ANION GAP SERPL CALC-SCNC: 10 MMOL/L (ref 7–16)
AST SERPL-CCNC: 16 U/L (ref 12–45)
BILIRUB SERPL-MCNC: 0.4 MG/DL (ref 0.1–1.5)
BUN SERPL-MCNC: 13 MG/DL (ref 8–22)
CALCIUM ALBUM COR SERPL-MCNC: 8.3 MG/DL (ref 8.5–10.5)
CALCIUM SERPL-MCNC: 9 MG/DL (ref 8.5–10.5)
CHLORIDE SERPL-SCNC: 105 MMOL/L (ref 96–112)
CHOLEST SERPL-MCNC: 138 MG/DL (ref 100–199)
CO2 SERPL-SCNC: 26 MMOL/L (ref 20–33)
CREAT SERPL-MCNC: 0.53 MG/DL (ref 0.5–1.4)
CRP SERPL HS-MCNC: <0.3 MG/DL (ref 0–0.75)
ERYTHROCYTE [SEDIMENTATION RATE] IN BLOOD BY WESTERGREN METHOD: 7 MM/HOUR (ref 0–25)
EST. AVERAGE GLUCOSE BLD GHB EST-MCNC: 108 MG/DL
FASTING STATUS PATIENT QL REPORTED: NORMAL
GFR SERPLBLD CREATININE-BSD FMLA CKD-EPI: 124 ML/MIN/1.73 M 2
GLOBULIN SER CALC-MCNC: 2.3 G/DL (ref 1.9–3.5)
GLUCOSE SERPL-MCNC: 99 MG/DL (ref 65–99)
HBA1C MFR BLD: 5.4 % (ref 4–5.6)
HDLC SERPL-MCNC: 36 MG/DL
LDLC SERPL CALC-MCNC: 95 MG/DL
POTASSIUM SERPL-SCNC: 4.7 MMOL/L (ref 3.6–5.5)
PROT SERPL-MCNC: 7.2 G/DL (ref 6–8.2)
RHEUMATOID FACT SER IA-ACNC: <10 IU/ML (ref 0–14)
SODIUM SERPL-SCNC: 141 MMOL/L (ref 135–145)
TRIGL SERPL-MCNC: 37 MG/DL (ref 0–149)

## 2024-11-19 PROCEDURE — 83036 HEMOGLOBIN GLYCOSYLATED A1C: CPT

## 2024-11-19 PROCEDURE — 80061 LIPID PANEL: CPT

## 2024-11-19 PROCEDURE — 86140 C-REACTIVE PROTEIN: CPT

## 2024-11-19 PROCEDURE — 85652 RBC SED RATE AUTOMATED: CPT

## 2024-11-19 PROCEDURE — 80053 COMPREHEN METABOLIC PANEL: CPT

## 2024-11-19 PROCEDURE — 73140 X-RAY EXAM OF FINGER(S): CPT | Mod: RT

## 2024-11-19 PROCEDURE — 86038 ANTINUCLEAR ANTIBODIES: CPT

## 2024-11-19 PROCEDURE — 36415 COLL VENOUS BLD VENIPUNCTURE: CPT

## 2024-11-19 PROCEDURE — 82306 VITAMIN D 25 HYDROXY: CPT

## 2024-11-19 PROCEDURE — 86431 RHEUMATOID FACTOR QUANT: CPT

## 2024-11-19 PROCEDURE — 73110 X-RAY EXAM OF WRIST: CPT | Mod: LT

## 2024-11-19 NOTE — PROGRESS NOTES
Verbal consent was acquired by the patient to use Pay with a Tweet ambient listening note generation during this visit     Chief Complaint   Patient presents with    Other     Thumb pain right and left.       Subjective:     HPI:   History of Present Illness  The patient presents for evaluation of pain in her thumb.    She began experiencing sharp pain in her thumb approximately two weeks ago, which has since spread to her left hand. The pain is more pronounced in her right hand, likely due to her being right-handed. 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 She is uncertain if the pain is due to overuse or a more serious condition such as arthritis. She has been a  intermittently for over 15 years.    Massaging the area provides some relief. She reports no numbness or tingling, except when she accidentally hits her elbow, which she believes affects a nerve and causes numbness in her arm. The pain is more severe in her right thumb than her left.        No problems updated.    ROS  See HPI     Allergies   Allergen Reactions    Nkda [No Known Drug Allergy]        Current medicines (including changes today)  Current Outpatient Medications   Medication Sig Dispense Refill    meloxicam (MOBIC) 15 MG tablet Take 1 Tablet by mouth every day. 30 Tablet 2     No current facility-administered medications for this visit.       Social History     Tobacco Use    Smoking status: Former     Current packs/day: 0.00     Types: Cigarettes     Start date: 2015     Quit date: 2018     Years since quittin.8    Smokeless tobacco: Never    Tobacco comments:     Quit on 2017   Vaping Use    Vaping status: Every Day    Substances: THC, CBD, Flavoring    Devices: Disposable   Substance Use Topics    Alcohol use: Yes     Comment: 1-2 mo.    Drug use: Yes     Types: Marijuana, Inhaled       Patient Active Problem List    Diagnosis Date Noted    IUD (intrauterine device) in place  2022    Bilateral hearing loss 2022    Encounter to establish care 2022    Allergic reaction 2022    GBS bacteriuria 2017    Rubella equivocal status, antepartum - needs PP vaccine 2017    Encounter for supervision of high risk pregnancy in second trimester, antepartum 2017    History of  section, desires repeat 2017       Family History   Problem Relation Age of Onset    Diabetes Mother     Hypertension Mother     Heart Disease Mother     Diabetes Father     Cancer Father         colon polyps    Hypertension Father     Heart Disease Maternal Grandmother     Heart Disease Maternal Grandfather           Objective:     BP 92/60 (BP Location: Left arm, Patient Position: Sitting, BP Cuff Size: Adult)   Pulse 70   Temp 36 °C (96.8 °F) (Temporal)   Resp 16   Wt 66.8 kg (147 lb 3.2 oz)   SpO2 92%  Body mass index is 26.92 kg/m².    Physical Exam:  Physical Exam  Vitals reviewed.   Constitutional:       General: She is awake.      Appearance: Normal appearance. She is well-developed.   HENT:      Head: Normocephalic.   Eyes:      Conjunctiva/sclera: Conjunctivae normal.   Cardiovascular:      Rate and Rhythm: Normal rate.   Pulmonary:      Effort: Pulmonary effort is normal. No respiratory distress.   Musculoskeletal:      Cervical back: Neck supple.   Skin:     General: Skin is warm and dry.   Neurological:      Mental Status: She is alert and oriented to person, place, and time.   Psychiatric:         Mood and Affect: Mood normal.         Behavior: Behavior normal. Behavior is cooperative.              Assessment and Plan:     The following treatment plan was discussed:    Problem List Items Addressed This Visit    None  Visit Diagnoses       Need for vaccination        Relevant Orders    INFLUENZA VACCINE TRI INJ (PF)  (Completed)    Pain of right thumb        Relevant Orders    DX-FINGER(S) 2+ RIGHT (Completed)    Pain in both wrists        Relevant  Medications    meloxicam (MOBIC) 15 MG tablet    Other Relevant Orders    DX-WRIST-COMPLETE 3+ LEFT (Completed)    DX-WRIST-COMPLETE 3+ RIGHT (Completed)    RHEUMATOID ARTHRITIS FACTOR    Sed Rate    CRP QUANTITIVE (NON-CARDIAC)    AUNG REFLEXIVE PROFILE            Assessment & Plan  1. Thumb pain.  The pain in her thumb could be attributed to arthritis, possibly due to repetitive movements. X-rays of both wrists and thumbs will be ordered. A referral to an orthopedic specialist will be made. She is advised to take meloxicam once daily with food, but should avoid ibuprofen, Advil, or Aleve. Tylenol can be used as needed. On her days off, she should refrain from using her thumbs. Wearing a brace during sleep is recommended. Blood work will be ordered to rule out rheumatoid arthritis or any autoimmune conditions.        Any change or worsening of signs or symptoms, patient encouraged to follow-up or report to emergency room for further evaluation. Patient verbalizes understanding and agrees.      PLEASE NOTE: This dictation was created using voice recognition software. I have made every reasonable attempt to correct obvious errors, but I expect that there are errors of grammar and possibly content that I did not discover before finalizing the note.

## 2024-11-21 LAB — NUCLEAR IGG SER QL IA: NORMAL

## 2025-02-25 DIAGNOSIS — M25.531 PAIN IN BOTH WRISTS: ICD-10-CM

## 2025-02-25 DIAGNOSIS — M25.532 PAIN IN BOTH WRISTS: ICD-10-CM

## 2025-02-28 RX ORDER — MELOXICAM 15 MG/1
15 TABLET ORAL DAILY
Qty: 30 TABLET | Refills: 2 | Status: SHIPPED | OUTPATIENT
Start: 2025-02-28

## 2025-06-19 DIAGNOSIS — M25.531 PAIN IN BOTH WRISTS: ICD-10-CM

## 2025-06-19 DIAGNOSIS — M25.532 PAIN IN BOTH WRISTS: ICD-10-CM

## 2025-06-20 RX ORDER — MELOXICAM 15 MG/1
15 TABLET ORAL DAILY
Qty: 30 TABLET | Refills: 2 | Status: SHIPPED | OUTPATIENT
Start: 2025-06-20

## 2025-06-27 ENCOUNTER — APPOINTMENT (OUTPATIENT)
Dept: MEDICAL GROUP | Facility: MEDICAL CENTER | Age: 35
End: 2025-06-27
Payer: MEDICAID

## 2025-08-08 ENCOUNTER — APPOINTMENT (OUTPATIENT)
Dept: URGENT CARE | Facility: CLINIC | Age: 35
End: 2025-08-08
Payer: MEDICAID

## (undated) DEVICE — PACK C-SECTION (2EA/CA)

## (undated) DEVICE — HEAD HOLDER JUNIOR/ADULT

## (undated) DEVICE — SET EXTENSION WITH 2 PORTS (48EA/CA) ***PART #2C8610 IS A SUBSTITUTE*****

## (undated) DEVICE — BAG, SPONGE COUNT 50600

## (undated) DEVICE — SLEEVE, SEQUENTIAL CALF REG

## (undated) DEVICE — SODIUM CHL IRRIGATION 0.9% 1000ML (12EA/CA)

## (undated) DEVICE — TUBING CLEARLINK DUO-VENT - C-FLO (48EA/CA)

## (undated) DEVICE — TRAY SPINAL ANESTHESIA NON-SAFETY (10/CA)

## (undated) DEVICE — CHLORAPREP 26 ML APPLICATOR - ORANGE TINT(25/CA)

## (undated) DEVICE — KIT  I.V. START (100EA/CA)

## (undated) DEVICE — CATHETER IV NON-SAFETY 18 GA X 1 1/4 (50/BX 4BX/CA)

## (undated) DEVICE — BLANKET UNDERBODY FULL ACCES - (5/CA)

## (undated) DEVICE — TAPE CLOTH MEDIPORE 6 INCH - (12RL/CA)

## (undated) DEVICE — CANISTER SUCTION 3000ML MECHANICAL FILTER AUTO SHUTOFF MEDI-VAC NONSTERILE LF DISP  (40EA/CA)

## (undated) DEVICE — WATER IRRIG. STER. 1500 ML - (9/CA)

## (undated) DEVICE — WATER IRRIGATION STERILE 1000ML (12EA/CA)

## (undated) DEVICE — BAG SPONGE COUNT 10.25 X 32 - BLUE (250/CA)

## (undated) DEVICE — ELECTRODE DUAL RETURN W/ CORD - (50/PK)

## (undated) DEVICE — DETERGENT RENUZYME PLUS 10 OZ PACKET (50/BX)

## (undated) DEVICE — SHEATH RO 4F 10CM (10EA/BX)

## (undated) DEVICE — STAPLER SKIN DISP - (6/BX 10BX/CA) VISISTAT

## (undated) DEVICE — TRAY BLADDER CARE W/ 16 FR FOLEY CATHETER STATLOCK  (10/CA)